# Patient Record
Sex: FEMALE | Race: WHITE | Employment: UNEMPLOYED | URBAN - METROPOLITAN AREA
[De-identification: names, ages, dates, MRNs, and addresses within clinical notes are randomized per-mention and may not be internally consistent; named-entity substitution may affect disease eponyms.]

---

## 2017-08-21 ENCOUNTER — HOSPITAL ENCOUNTER (EMERGENCY)
Age: 57
Discharge: HOME | End: 2017-08-21
Attending: EMERGENCY MEDICINE
Payer: MEDICARE

## 2017-08-21 ENCOUNTER — APPOINTMENT (OUTPATIENT)
Dept: GENERAL RADIOLOGY | Age: 57
End: 2017-08-21
Attending: PHYSICIAN ASSISTANT
Payer: MEDICARE

## 2017-08-21 VITALS
TEMPERATURE: 98.4 F | OXYGEN SATURATION: 99 % | WEIGHT: 165 LBS | DIASTOLIC BLOOD PRESSURE: 89 MMHG | RESPIRATION RATE: 16 BRPM | HEART RATE: 81 BPM | SYSTOLIC BLOOD PRESSURE: 147 MMHG | BODY MASS INDEX: 27.49 KG/M2 | HEIGHT: 65 IN

## 2017-08-21 DIAGNOSIS — M43.9 COMPRESSION DEFORMITY OF VERTEBRA: ICD-10-CM

## 2017-08-21 DIAGNOSIS — Z86.59 H/O ANXIETY DISORDER: ICD-10-CM

## 2017-08-21 DIAGNOSIS — M54.50 ACUTE BILATERAL LOW BACK PAIN WITHOUT SCIATICA: ICD-10-CM
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
## 2017-08-21 PROCEDURE — L0172 CERV COL SR FOAM 2PC PRE OTS: HCPCS

## 2017-08-21 PROCEDURE — 99284 EMERGENCY DEPT VISIT MOD MDM: CPT

## 2017-08-21 PROCEDURE — 72100 X-RAY EXAM L-S SPINE 2/3 VWS: CPT

## 2017-08-21 PROCEDURE — 72072 X-RAY EXAM THORAC SPINE 3VWS: CPT

## 2017-08-21 PROCEDURE — 74011250637 HC RX REV CODE- 250/637: Performed by: PHYSICIAN ASSISTANT

## 2017-08-21 PROCEDURE — 72050 X-RAY EXAM NECK SPINE 4/5VWS: CPT

## 2017-08-21 RX ORDER — HYDROMORPHONE HYDROCHLORIDE 2 MG/1
1 TABLET ORAL
Status: COMPLETED | OUTPATIENT
Start: 2017-08-21 | End: 2017-08-21

## 2017-08-21 RX ORDER — OXYCODONE AND ACETAMINOPHEN 5; 325 MG/1; MG/1
1 TABLET ORAL
Qty: 15 TAB | Refills: 0 | Status: SHIPPED | OUTPATIENT
Start: 2017-08-21

## 2017-08-21 RX ORDER — OXYCODONE AND ACETAMINOPHEN 5; 325 MG/1; MG/1
1 TABLET ORAL
Status: COMPLETED | OUTPATIENT
Start: 2017-08-21 | End: 2017-08-21

## 2017-08-21 RX ORDER — LIDOCAINE 50 MG/G
1 PATCH TOPICAL EVERY 24 HOURS
Status: DISCONTINUED | OUTPATIENT
Start: 2017-08-21 | End: 2017-08-21 | Stop reason: HOSPADM

## 2017-08-21 RX ADMIN — OXYCODONE AND ACETAMINOPHEN 1 TABLET: 5; 325 TABLET ORAL at 14:55

## 2017-08-21 RX ADMIN — HYDROMORPHONE HYDROCHLORIDE 1 MG: 2 TABLET ORAL at 15:55
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
## 2017-08-21 NOTE — LETTER
[address] 
[facility] EMERGENCY DEPT 
[address] [place] [address] [id number] 
[phone] Work/School Note Date: 8/21/2017 To Whom It May concern: 
 
Chandrakant Jin was seen and treated today in the emergency room by the following provider(s): 
Attending Provider: Radha Jackman DO Physician Assistant: Cinthia Price. Cyndi Nunez. Chandrakant Jin may return to work on 08/23/2017. Sincerely, 
 
 
 
 
Cinthia Price.  [place], [place]
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
## 2017-08-30 ENCOUNTER — OFFICE VISIT (OUTPATIENT)
Dept: NEUROLOGY | Age: 57
End: 2017-08-30

## 2017-08-30 VITALS
HEART RATE: 66 BPM | HEIGHT: 65 IN | RESPIRATION RATE: 18 BRPM | WEIGHT: 168 LBS | DIASTOLIC BLOOD PRESSURE: 76 MMHG | SYSTOLIC BLOOD PRESSURE: 118 MMHG | BODY MASS INDEX: 27.99 KG/M2 | OXYGEN SATURATION: 97 %

## 2017-08-30 DIAGNOSIS — M54.2 CERVICALGIA: ICD-10-CM

## 2017-08-30 DIAGNOSIS — G44.319 ACUTE POST-TRAUMATIC HEADACHE, NOT INTRACTABLE: ICD-10-CM

## 2017-08-30 DIAGNOSIS — S06.9X0A: Primary | ICD-10-CM

## 2017-08-30 RX ORDER — ASPIRIN 81 MG/1
TABLET ORAL DAILY
COMMUNITY

## 2017-08-30 RX ORDER — GINKGO BILOBA LEAF EXTRACT 60 MG
1000 CAPSULE ORAL DAILY
COMMUNITY

## 2017-08-30 RX ORDER — TRAZODONE HYDROCHLORIDE 100 MG/1
100 TABLET ORAL
COMMUNITY

## 2017-08-30 RX ORDER — EZETIMIBE 10 MG/1
TABLET ORAL
COMMUNITY

## 2017-08-30 RX ORDER — ATORVASTATIN CALCIUM 80 MG/1
TABLET, FILM COATED ORAL
COMMUNITY
Start: 2017-08-23

## 2017-08-30 RX ORDER — LORAZEPAM 1 MG/1
TABLET ORAL
COMMUNITY
Start: 2017-07-19

## 2017-08-30 NOTE — PATIENT INSTRUCTIONS
[facility]   Statement to Patients  April 1, 2014      In an effort to ensure the large volume of patient prescription refills is processed in the most efficient and expeditious manner, we are asking our patients to assist us by calling your Pharmacy for all prescription refills, this will include also your  Mail Order Pharmacy. The pharmacy will contact our office electronically to continue the refill process. Please do not wait until the last minute to call your pharmacy. We need at least 48 hours (2days) to fill prescriptions. We also encourage you to call your pharmacy before going to  your prescription to make sure it is ready. With regard to controlled substance prescription refill requests (narcotic refills) that need to be picked up at our office, we ask your cooperation by providing us with at least 72 hours (3days) notice that you will need a refill. We will not refill narcotic prescription refill requests after 4:00pm on any weekday, Monday through Thursday, or after 2:00pm on Fridays, or on the weekends. We encourage everyone to explore another way of getting your prescription refill request processed using [employer], our patient web portal through our electronic medical record system. [employer] is an efficient and effective way to communicate your medication request directly to the office and  downloadable as an bozena on your smart phone . [employer] also features a review functionality that allows you to view your medication list as well as leave messages for your physician. Are you ready to get connected? If so please review the attatched instructions or speak to any of our staff to get you set up right away! Thank you so much for your cooperation. Should you have any questions please contact our Practice Administrator.     The Physicians and Staff,  [facility]         Please bring all medication bottles, including vitamins, supplements and any over-the-counter medications, to your next office visit.
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
## 2017-08-30 NOTE — MR AVS SNAPSHOT
Visit Information Date & Time Provider Department Dept. Phone Encounter #  
 8/30/2017  8:00 AM Sally Adkins MD [facility] at [address] [id number] Follow-up Instructions Return if symptoms worsen or fail to improve. Upcoming Health Maintenance Date Due Hepatitis C Screening 1960 DTaP/Tdap/Td series (1 - Tdap) 5/20/1981 PAP AKA CERVICAL CYTOLOGY 5/20/1981 FOBT Q 1 YEAR AGE 50-75 5/20/2010 INFLUENZA AGE 9 TO ADULT 8/1/2017 BREAST CANCER SCRN MAMMOGRAM 8/15/2018 Allergies as of 8/30/2017  Review Complete On: 8/30/2017 By: Catarino Momin LPN Severity Noted Reaction Type Reactions Niaspan [Niacin] High 12/12/2015    Anaphylaxis Nucynta [Tapentadol] High 05/18/2014    Anaphylaxis Per pt, she has taken oxycodone in the past with no issues. Prednisone  05/18/2014    Other (comments)  
 hallucinations Valium [Diazepam]  05/18/2014    Other (comments) Makes patient hyper Current Immunizations  Reviewed on 12/26/2015 No immunizations on file. Not reviewed this visit Vitals BP Pulse Resp Height(growth percentile) Weight(growth percentile) SpO2  
 118/76 66 18 5' 5\" (1.651 m) 168 lb (76.2 kg) 97% BMI OB Status Smoking Status 27.96 kg/m2 Hysterectomy Never Smoker Vitals History BMI and BSA Data Body Mass Index Body Surface Area  
 27.96 kg/m 2 1.87 m 2 Preferred Pharmacy Pharmacy Name [address] [place] [phone] Your Updated Medication List  
  
   
This list is accurate as of: 8/30/17  8:38 AM.  Always use your most recent med list.  
  
  
  
  
 ALIGN 4 mg Cap Generic drug:  Bifidobacterium Infantis Take 1 Cap by mouth daily. ALLERCLEAR 10 mg tablet Generic drug:  loratadine Take 10 mg by mouth daily. AMBIEN 5 mg tablet Generic drug:  zolpidem Take 5 mg by mouth nightly. aspirin delayed-release 81 mg tablet Take  by mouth daily. atorvastatin 80 mg tablet Commonly known as:  LIPITOR * baclofen 20 mg tablet Commonly known as:  LIORESAL Take 20 mg by mouth two (2) times a day. 20mg at 6am AND 20mg at bedtime * baclofen 10 mg tablet Commonly known as:  LIORESAL Take 10 mg by mouth daily (after lunch). AT 2PM  
  
 co-enzyme Q-10 100 mg capsule Commonly known as:  CO Q-10 Take 100 mg by mouth daily (with dinner). CRESTOR 20 mg tablet Generic drug:  rosuvastatin Take 20 mg by mouth daily (with dinner). CYMBALTA 30 mg capsule Generic drug:  DULoxetine Take 60 mg by mouth nightly. dicyclomine 10 mg capsule Commonly known as:  BENTYL Take 1 Cap by mouth Before breakfast, lunch, dinner and at bedtime. estradiol 2 mg tablet Commonly known as:  ESTRACE Take 2 mg by mouth nightly. Amelia Prim-Linoleic-Gamolenic Ac 1,000 mg Cap Take 1 Cap by mouth daily (with breakfast). evening primrose oil 500 mg Cap Take 1,000 mg by mouth daily. FLONASE 50 mcg/actuation nasal spray Generic drug:  fluticasone 2 Sprays by Both Nostrils route daily. gabapentin 600 mg tablet Commonly known as:  NEURONTIN Take 300 mg by mouth daily. irbesartan 300 mg tablet Commonly known as:  AVAPRO Take 300 mg by mouth daily. * LORazepam 2 mg tablet Commonly known as:  ATIVAN Take 2 mg by mouth three (3) times daily. * LORazepam 1 mg tablet Commonly known as:  ATIVAN  
  
 multivitamin tablet Commonly known as:  ONE A DAY Take 1 Tab by mouth daily. ondansetron 8 mg disintegrating tablet Commonly known as:  ZOFRAN ODT Take 1 Tab by mouth every eight (8) hours as needed for Nausea. oxyCODONE-acetaminophen 5-325 mg per tablet Commonly known as:  PERCOCET Take 1 Tab by mouth every eight (8) hours as needed for Pain. Max Daily Amount: 3 Tabs. promethazine 25 mg tablet Commonly known as:  PHENERGAN Take 25 mg by mouth every six (6) hours as needed for Nausea. PROTONIX 40 mg tablet Generic drug:  pantoprazole Take 40 mg by mouth two (2) times a day. traZODone 100 mg tablet Commonly known as:  [place] Take 100 mg by mouth nightly. VITAMIN D3 2,000 unit Tab Generic drug:  cholecalciferol (vitamin D3) Take 2,000 Units by mouth daily. ZETIA 10 mg tablet Generic drug:  ezetimibe Take  by mouth. * Notice: This list has 4 medication(s) that are the same as other medications prescribed for you. Read the directions carefully, and ask your doctor or other care provider to review them with you. Follow-up Instructions Return if symptoms worsen or fail to improve. Patient Instructions PRESCRIPTION REFILL POLICY [facility] Statement to Patients April 1, 2014 In an effort to ensure the large volume of patient prescription refills is processed in the most efficient and expeditious manner, we are asking our patients to assist us by calling your Pharmacy for all prescription refills, this will include also your  Mail Order Pharmacy. The pharmacy will contact our office electronically to continue the refill process. Please do not wait until the last minute to call your pharmacy. We need at least 48 hours (2days) to fill prescriptions. We also encourage you to call your pharmacy before going to  your prescription to make sure it is ready. With regard to controlled substance prescription refill requests (narcotic refills) that need to be picked up at our office, we ask your cooperation by providing us with at least 72 hours (3days) notice that you will need a refill. We will not refill narcotic prescription refill requests after 4:00pm on any weekday, Monday through Thursday, or after 2:00pm on Fridays, or on the weekends. We encourage everyone to explore another way of getting your prescription refill request processed using [employer], our patient web portal through our electronic medical record system. [employer] is an efficient and effective way to communicate your medication request directly to the office and  downloadable as an bozena on your smart phone . [employer] also features a review functionality that allows you to view your medication list as well as leave messages for your physician. Are you ready to get connected? If so please review the attatched instructions or speak to any of our staff to get you set up right away! Thank you so much for your cooperation. Should you have any questions please contact our Practice Administrator. The Physicians and Staff,  [facility] Please bring all medication bottles, including vitamins, supplements and any over-the-counter medications, to your next office visit. Introducing [facility]! Kerrie Hainse introduces [employer] patient portal. Now you can access parts of your medical record, email your doctor's office, and request medication refills online. 1. In your internet browser, go to [email or web address] 2. Click on the First Time User? Click Here link in the Sign In box. You will see the New Member Sign Up page. 3. Enter your [employer] Access Code exactly as it appears below. You will not need to use this code after youve completed the sign-up process. If you do not sign up before the expiration date, you must request a new code. · [employer] Access Code: [email or web address] Expires: 11/19/2017  3:06 PM 
 
4. Enter the last four digits of your Social Security Number (xxxx) and Date of Birth (mm/dd/yyyy) as indicated and click Submit. You will be taken to the next sign-up page. 5. Create a [employer] ID. This will be your [employer] login ID and cannot be changed, so think of one that is secure and easy to remember. 6. Create a [employer] password. You can change your password at any time. 7. Enter your Password Reset Question and Answer. This can be used at a later time if you forget your password. 8. Enter your e-mail address. You will receive e-mail notification when new information is available in [address] 9. Click Sign Up. You can now view and download portions of your medical record. 10. Click the Download Summary menu link to download a portable copy of your medical information. If you have questions, please visit the Frequently Asked Questions section of the [employer] website. Remember, [employer] is NOT to be used for urgent needs. For medical emergencies, dial 911. Now available from your iPhone and Android! Please provide this summary of care documentation to your next provider. Your primary care clinician is listed as Britany Leggett. If you have any questions after today's visit, please call [phone].

## 2017-11-20 ENCOUNTER — HOSPITAL ENCOUNTER (OUTPATIENT)
Dept: MRI IMAGING | Age: 57
Discharge: HOME | End: 2017-11-20

## 2017-11-20 DIAGNOSIS — M47.817 LUMBOSACRAL SPONDYLOSIS WITHOUT MYELOPATHY: ICD-10-CM

## 2017-11-20 DIAGNOSIS — M54.50 LOW BACK PAIN: ICD-10-CM

## 2017-11-20 DIAGNOSIS — M47.812 CERVICAL SPONDYLOSIS WITHOUT MYELOPATHY: ICD-10-CM

## 2017-11-20 DIAGNOSIS — M54.2 CERVICALGIA: ICD-10-CM

## 2017-11-20 DIAGNOSIS — Z98.1 HISTORY OF LUMBAR FUSION: ICD-10-CM

## 2017-11-20 PROCEDURE — 72141 MRI NECK SPINE W/O DYE: CPT

## 2017-11-20 PROCEDURE — 72148 MRI LUMBAR SPINE W/O DYE: CPT

## 2017-12-08 ENCOUNTER — HOSPITAL ENCOUNTER (OUTPATIENT)
Dept: MRI IMAGING | Age: 57
Discharge: HOME | End: 2017-12-08
Attending: PHYSICAL MEDICINE & REHABILITATION
Payer: MEDICARE

## 2017-12-08 VITALS — BODY MASS INDEX: 28.29 KG/M2 | WEIGHT: 170 LBS

## 2017-12-08 DIAGNOSIS — G95.9 CERVICAL MYELOPATHY (HCC): ICD-10-CM

## 2017-12-08 DIAGNOSIS — M54.2 CERVICALGIA: ICD-10-CM

## 2017-12-08 LAB — CREAT BLD-MCNC: 0.7 MG/DL (ref 0.6–1.3)

## 2017-12-08 PROCEDURE — 74011250636 HC RX REV CODE- 250/636: Performed by: PHYSICAL MEDICINE & REHABILITATION

## 2017-12-08 PROCEDURE — 72142 MRI NECK SPINE W/DYE: CPT

## 2017-12-08 PROCEDURE — A9576 INJ PROHANCE MULTIPACK: HCPCS | Performed by: PHYSICAL MEDICINE & REHABILITATION

## 2017-12-08 PROCEDURE — 82565 ASSAY OF CREATININE: CPT

## 2017-12-08 RX ADMIN — GADOTERIDOL 15 ML: 279.3 INJECTION, SOLUTION INTRAVENOUS at 15:36

## 2018-12-03 ENCOUNTER — HOSPITAL ENCOUNTER (OUTPATIENT)
Age: 58
Setting detail: OUTPATIENT SURGERY
Discharge: HOME | End: 2018-12-03
Attending: SPECIALIST | Admitting: SPECIALIST
Payer: MEDICARE

## 2018-12-03 VITALS
SYSTOLIC BLOOD PRESSURE: 122 MMHG | DIASTOLIC BLOOD PRESSURE: 77 MMHG | OXYGEN SATURATION: 99 % | HEIGHT: 65 IN | RESPIRATION RATE: 18 BRPM | HEART RATE: 60 BPM | BODY MASS INDEX: 28.29 KG/M2

## 2018-12-03 PROCEDURE — 76040000007: Performed by: SPECIALIST

## 2018-12-03 PROCEDURE — 74011000250 HC RX REV CODE- 250: Performed by: SPECIALIST

## 2018-12-03 RX ORDER — LIDOCAINE HYDROCHLORIDE 20 MG/ML
JELLY TOPICAL ONCE
Status: COMPLETED | OUTPATIENT
Start: 2018-12-03 | End: 2018-12-03

## 2018-12-03 RX ADMIN — LIDOCAINE HYDROCHLORIDE 5 ML: 20 JELLY TOPICAL at 08:27

## 2018-12-03 NOTE — DISCHARGE INSTRUCTIONS
King Bryce  [id number]  1960      MANOMETRY DISCHARGE INSTRUCTION    You may resume your regular diet as tolerated. You may resume your normal daily activities. If you develop a sore throat- throat lozenges or warm salt water gargles will help. Call your Physician if you have any complications or questions. [employer] Activation    Thank you for requesting access to [employer]. Please follow the instructions below to securely access and download your online medical record. [employer] allows you to send messages to your doctor, view your test results, renew your prescriptions, schedule appointments, and more. How Do I Sign Up? 1. In your internet browser, go to [email or web address]  2. Click on the First Time User? Click Here link in the Sign In box. You will be redirect to the New Member Sign Up page. 3. Enter your [employer] Access Code exactly as it appears below. You will not need to use this code after youve completed the sign-up process. If you do not sign up before the expiration date, you must request a new code. [employer] Access Code: [email or web address]  Expires: 3/3/2019  7:58 AM (This is the date your [employer] access code will )    4. Enter the last four digits of your Social Security Number (xxxx) and Date of Birth (mm/dd/yyyy) as indicated and click Submit. You will be taken to the next sign-up page. 5. Create a [employer] ID. This will be your [employer] login ID and cannot be changed, so think of one that is secure and easy to remember. 6. Create a [employer] password. You can change your password at any time. 7. Enter your Password Reset Question and Answer. This can be used at a later time if you forget your password. 8. Enter your e-mail address. You will receive e-mail notification when new information is available in [address] 9. Click Sign Up. You can now view and download portions of your medical record.   10. Click the Download Summary menu link to download a portable copy of your medical information. Additional Information    If you have questions, please visit the Frequently Asked Questions section of the [employer] website at [email or web address] Remember, [employer] is NOT to be used for urgent needs. For medical emergencies, dial 911.

## 2018-12-12 NOTE — OP NOTES
[facility]  OPERATIVE REPORT    Sravani Guevara  MR#: [id number]  : 1960  ACCOUNT #: [de-identified]   DATE OF SERVICE: 2018    PROCEDURES PERFORMED:  1. High-resolution esophageal manometry. 2.  Impedance esophageal manometry. PREOPERATIVE DIAGNOSES:    1.  Bloating. 2.  Esophageal dyskinesia. POSTOPERATIVE DIAGNOSES:  1. Abnormal study. 2.  Relaxation of esophagogastric junction is normal.   3.  Hypercontractile esophagus with at least 1 contraction having DCI greater than 8000 in the absence of esophagogastric junction outflow obstruction. 4.  Large peristaltic breaks. 5.  Seventy percent of impedance boluses failed to clear completely. SURGEON:  Joel Purvis MD    ASSISTANT:  Delisa Aguillon RN     ANESTHESIA:  Topical .    ESTIMATED BLOOD LOSS:  none. COMPLICATIONS:  none. SPECIMENS REMOVED:  none. IMPLANTS:  none. DESCRIPTION OF PROCEDURE:  High-resolution esophageal manometry is performed by the nursing staff with subsequent interpretation by Dr. Ayla Reyna. The lower esophageal sphincter is at 41.3 cm and for a distance of 4.8 cm distally. Lower esophageal sphincter pressures are as follows:  Respiratory minimum of 28 (4.8-32), respiratory mean 51.3 (13-43), residual after swallowing normal at 12.3, normal less than 15. The upper esophageal sphincter pressure is not reported here. There is not a reliable test for measurement of or interpretation of clinical upper esophageal sphincter disorders. Wet swallows were then administered. By standard criteria, 6 are peristaltic, 2 are simultaneous and 2 are failed. The mean amplitude in the distal esophagus is normal at 132.0 mmHg. The wave duration is prolonged at 5.8 seconds. There are 22% double-peaked waves, less than 15% is normal.  There are 11% triple-peaked waves, 0% is normal.    High resolution scoring is as follows:  DCI mean elevated 6823.3 (500-5000).   DCI is some of the area under the curve of the esophageal pressure time curve. Contractile front velocity normal 2.9, intrabolus pressure at LES 2.6, which is normal.  Intrabolus pressure average maximum body of the esophagus normal at 11.3. [place] scoring is as follows:  Distal latency 13.6, percent failed 10, percent pan esophageal pressurization 0, percent premature 0, percent rapid 10, percent large breaks 60, percent small breaks 0. Impedance manometry is performed with a fluid and electrolyte solution. Seven of 10 impedance boluses failed to clear completely. There are 2 contractions with a markedly elevated DCI. These are #4 and #9. So there are 3 contractions with a markedly elevated DCI #4, #6 and #9. The large breaks refer to large transition zone defects between the proximal striated esophagus and the middle smooth muscle esophagus. The impedance manometry correlates with the manometric findings in the body of the esophagus. This is a markedly abnormal study. It is not achalasia.       Sujatha Hsu MD       [address] [place]  D: 12/11/2018 20:33     T: 12/12/2018 07:04  JOB #: [id number]  CC: Rosa Maria Chao MD  CC: Selene Romero MD

## 2019-01-07 ENCOUNTER — HOSPITAL ENCOUNTER (OUTPATIENT)
Dept: CT IMAGING | Age: 59
Discharge: HOME | End: 2019-01-07
Attending: SPECIALIST
Payer: MEDICARE

## 2019-01-07 DIAGNOSIS — R14.0 BLOATING SYMPTOM: ICD-10-CM

## 2019-01-07 DIAGNOSIS — R19.7 DIARRHEA, UNSPECIFIED: ICD-10-CM

## 2019-01-07 DIAGNOSIS — Z90.49 ACQUIRED ABSENCE OF LARGE INTESTINE: ICD-10-CM

## 2019-01-07 PROCEDURE — 74011250636 HC RX REV CODE- 250/636: Performed by: SPECIALIST

## 2019-01-07 PROCEDURE — 74177 CT ABD & PELVIS W/CONTRAST: CPT

## 2019-01-07 PROCEDURE — 74011636320 HC RX REV CODE- 636/320: Performed by: SPECIALIST

## 2019-01-07 RX ORDER — SODIUM CHLORIDE 0.9 % (FLUSH) 0.9 %
10 SYRINGE (ML) INJECTION
Status: COMPLETED | OUTPATIENT
Start: 2019-01-07 | End: 2019-01-07

## 2019-01-07 RX ORDER — SODIUM CHLORIDE 9 MG/ML
50 INJECTION, SOLUTION INTRAVENOUS
Status: COMPLETED | OUTPATIENT
Start: 2019-01-07 | End: 2019-01-07

## 2019-01-07 RX ADMIN — SODIUM CHLORIDE 50 ML/HR: 900 INJECTION, SOLUTION INTRAVENOUS at 14:28

## 2019-01-07 RX ADMIN — IOHEXOL 50 ML: 240 INJECTION, SOLUTION INTRATHECAL; INTRAVASCULAR; INTRAVENOUS; ORAL at 14:28

## 2019-01-07 RX ADMIN — Medication 10 ML: at 14:28

## 2019-01-07 RX ADMIN — IOPAMIDOL 100 ML: 755 INJECTION, SOLUTION INTRAVENOUS at 14:27

## 2022-11-05 ENCOUNTER — APPOINTMENT (OUTPATIENT)
Dept: CT IMAGING | Age: 62
End: 2022-11-05
Attending: STUDENT IN AN ORGANIZED HEALTH CARE EDUCATION/TRAINING PROGRAM
Payer: MEDICARE

## 2022-11-05 ENCOUNTER — HOSPITAL ENCOUNTER (OUTPATIENT)
Age: 62
Setting detail: OBSERVATION
Discharge: HOME | End: 2022-11-09
Attending: STUDENT IN AN ORGANIZED HEALTH CARE EDUCATION/TRAINING PROGRAM | Admitting: INTERNAL MEDICINE
Payer: MEDICARE

## 2022-11-05 DIAGNOSIS — R79.89 ELEVATED LFTS: ICD-10-CM

## 2022-11-05 DIAGNOSIS — K56.7 ILEUS (HCC): Primary | ICD-10-CM

## 2022-11-05 PROBLEM — R10.9 ABDOMINAL PAIN: Status: ACTIVE | Noted: 2022-11-05

## 2022-11-05 LAB
ALBUMIN SERPL-MCNC: 3 G/DL (ref 3.5–5)
ALBUMIN/GLOB SERPL: 0.8 {RATIO} (ref 1.1–2.2)
ALP SERPL-CCNC: 183 U/L (ref 45–117)
ALT SERPL-CCNC: 432 U/L (ref 12–78)
ANION GAP SERPL CALC-SCNC: 4 MMOL/L (ref 5–15)
APPEARANCE UR: CLEAR
AST SERPL-CCNC: 398 U/L (ref 15–37)
BACTERIA URNS QL MICRO: NEGATIVE /HPF
BASE EXCESS BLD CALC-SCNC: 4.3 MMOL/L
BASOPHILS # BLD: 0 K/UL (ref 0–0.1)
BASOPHILS NFR BLD: 0 % (ref 0–1)
BILIRUB SERPL-MCNC: 1.3 MG/DL (ref 0.2–1)
BILIRUB UR QL: NEGATIVE
BUN SERPL-MCNC: 14 MG/DL (ref 6–20)
BUN/CREAT SERPL: 18 (ref 12–20)
CA-I BLD-MCNC: 1.19 MMOL/L (ref 1.12–1.32)
CALCIUM SERPL-MCNC: 8.7 MG/DL (ref 8.5–10.1)
CHLORIDE BLD-SCNC: 105 MMOL/L (ref 100–108)
CHLORIDE SERPL-SCNC: 107 MMOL/L (ref 97–108)
CO2 BLD-SCNC: 30 MMOL/L (ref 19–24)
CO2 SERPL-SCNC: 30 MMOL/L (ref 21–32)
COLOR UR: ABNORMAL
CREAT SERPL-MCNC: 0.76 MG/DL (ref 0.55–1.02)
CREAT UR-MCNC: 0.7 MG/DL (ref 0.6–1.3)
DIFFERENTIAL METHOD BLD: ABNORMAL
EOSINOPHIL # BLD: 0.1 K/UL (ref 0–0.4)
EOSINOPHIL NFR BLD: 1 % (ref 0–7)
EPITH CASTS URNS QL MICRO: ABNORMAL /LPF
ERYTHROCYTE [DISTWIDTH] IN BLOOD BY AUTOMATED COUNT: 14.3 % (ref 11.5–14.5)
GLOBULIN SER CALC-MCNC: 3.8 G/DL (ref 2–4)
GLUCOSE BLD STRIP.AUTO-MCNC: 116 MG/DL (ref 74–106)
GLUCOSE SERPL-MCNC: 120 MG/DL (ref 65–100)
GLUCOSE UR STRIP.AUTO-MCNC: NEGATIVE MG/DL
HCO3 BLDA-SCNC: 30 MMOL/L
HCT VFR BLD AUTO: 37 % (ref 35–47)
HGB BLD-MCNC: 11.8 G/DL (ref 11.5–16)
HGB UR QL STRIP: NEGATIVE
HYALINE CASTS URNS QL MICRO: ABNORMAL /LPF (ref 0–2)
IMM GRANULOCYTES # BLD AUTO: 0.1 K/UL (ref 0–0.04)
IMM GRANULOCYTES NFR BLD AUTO: 1 % (ref 0–0.5)
KETONES UR QL STRIP.AUTO: NEGATIVE MG/DL
LACTATE BLD-SCNC: 0.59 MMOL/L (ref 0.4–2)
LEUKOCYTE ESTERASE UR QL STRIP.AUTO: ABNORMAL
LIPASE SERPL-CCNC: 86 U/L (ref 73–393)
LYMPHOCYTES # BLD: 1.1 K/UL (ref 0.8–3.5)
LYMPHOCYTES NFR BLD: 11 % (ref 12–49)
MCH RBC QN AUTO: 28.2 PG (ref 26–34)
MCHC RBC AUTO-ENTMCNC: 31.9 G/DL (ref 30–36.5)
MCV RBC AUTO: 88.5 FL (ref 80–99)
MONOCYTES # BLD: 0.5 K/UL (ref 0–1)
MONOCYTES NFR BLD: 5 % (ref 5–13)
NEUTS SEG # BLD: 8.7 K/UL (ref 1.8–8)
NEUTS SEG NFR BLD: 82 % (ref 32–75)
NITRITE UR QL STRIP.AUTO: NEGATIVE
NRBC # BLD: 0 K/UL (ref 0–0.01)
NRBC BLD-RTO: 0 PER 100 WBC
PCO2 BLDV: 50.1 MMHG (ref 41–51)
PH BLDV: 7.39 [PH] (ref 7.32–7.42)
PH UR STRIP: 6.5 [PH] (ref 5–8)
PLATELET # BLD AUTO: 278 K/UL (ref 150–400)
PMV BLD AUTO: 9.6 FL (ref 8.9–12.9)
PO2 BLDV: 18 MMHG (ref 25–40)
POTASSIUM BLD-SCNC: 3.8 MMOL/L (ref 3.5–5.5)
POTASSIUM SERPL-SCNC: 3.7 MMOL/L (ref 3.5–5.1)
PROT SERPL-MCNC: 6.8 G/DL (ref 6.4–8.2)
PROT UR STRIP-MCNC: NEGATIVE MG/DL
RBC # BLD AUTO: 4.18 M/UL (ref 3.8–5.2)
RBC #/AREA URNS HPF: ABNORMAL /HPF (ref 0–5)
SODIUM BLD-SCNC: 145 MMOL/L (ref 136–145)
SODIUM SERPL-SCNC: 141 MMOL/L (ref 136–145)
SP GR UR REFRACTOMETRY: 1.02
SPECIMEN SITE: ABNORMAL
UA: UC IF INDICATED,UAUC: ABNORMAL
UROBILINOGEN UR QL STRIP.AUTO: 0.2 EU/DL (ref 0.2–1)
WBC # BLD AUTO: 10.4 K/UL (ref 3.6–11)
WBC URNS QL MICRO: ABNORMAL /HPF (ref 0–4)

## 2022-11-05 PROCEDURE — 99285 EMERGENCY DEPT VISIT HI MDM: CPT

## 2022-11-05 PROCEDURE — 81001 URINALYSIS AUTO W/SCOPE: CPT

## 2022-11-05 PROCEDURE — 74177 CT ABD & PELVIS W/CONTRAST: CPT

## 2022-11-05 PROCEDURE — 83690 ASSAY OF LIPASE: CPT

## 2022-11-05 PROCEDURE — 80053 COMPREHEN METABOLIC PANEL: CPT

## 2022-11-05 PROCEDURE — 74011250636 HC RX REV CODE- 250/636: Performed by: STUDENT IN AN ORGANIZED HEALTH CARE EDUCATION/TRAINING PROGRAM

## 2022-11-05 PROCEDURE — 36415 COLL VENOUS BLD VENIPUNCTURE: CPT

## 2022-11-05 PROCEDURE — 82947 ASSAY GLUCOSE BLOOD QUANT: CPT

## 2022-11-05 PROCEDURE — 96375 TX/PRO/DX INJ NEW DRUG ADDON: CPT

## 2022-11-05 PROCEDURE — 96374 THER/PROPH/DIAG INJ IV PUSH: CPT

## 2022-11-05 PROCEDURE — 65270000015 HC RM PRIVATE ONCOLOGY

## 2022-11-05 PROCEDURE — 65390000012 HC CONDITION CODE 44 OBSERVATION

## 2022-11-05 PROCEDURE — 85025 COMPLETE CBC W/AUTO DIFF WBC: CPT

## 2022-11-05 PROCEDURE — 74011250636 HC RX REV CODE- 250/636: Performed by: NURSE PRACTITIONER

## 2022-11-05 PROCEDURE — 74011000636 HC RX REV CODE- 636: Performed by: STUDENT IN AN ORGANIZED HEALTH CARE EDUCATION/TRAINING PROGRAM

## 2022-11-05 RX ORDER — OMEPRAZOLE 40 MG/1
40 CAPSULE, DELAYED RELEASE ORAL DAILY
COMMUNITY

## 2022-11-05 RX ORDER — FENTANYL CITRATE 50 UG/ML
50 INJECTION, SOLUTION INTRAMUSCULAR; INTRAVENOUS ONCE
Status: COMPLETED | OUTPATIENT
Start: 2022-11-05 | End: 2022-11-05

## 2022-11-05 RX ORDER — DILTIAZEM HYDROCHLORIDE 60 MG/1
60 TABLET, FILM COATED ORAL 2 TIMES DAILY
COMMUNITY

## 2022-11-05 RX ORDER — CYCLOBENZAPRINE HCL 10 MG
10 TABLET ORAL
COMMUNITY

## 2022-11-05 RX ORDER — PROCHLORPERAZINE EDISYLATE 5 MG/ML
10 INJECTION INTRAMUSCULAR; INTRAVENOUS ONCE
Status: COMPLETED | OUTPATIENT
Start: 2022-11-05 | End: 2022-11-05

## 2022-11-05 RX ORDER — FENTANYL CITRATE 50 UG/ML
50 INJECTION, SOLUTION INTRAMUSCULAR; INTRAVENOUS
Status: ACTIVE | OUTPATIENT
Start: 2022-11-05 | End: 2022-11-06

## 2022-11-05 RX ORDER — ONDANSETRON HYDROCHLORIDE 8 MG/1
8 TABLET, FILM COATED ORAL
COMMUNITY
End: 2022-11-09

## 2022-11-05 RX ORDER — METOCLOPRAMIDE HYDROCHLORIDE 5 MG/ML
10 INJECTION INTRAMUSCULAR; INTRAVENOUS
Status: COMPLETED | OUTPATIENT
Start: 2022-11-05 | End: 2022-11-05

## 2022-11-05 RX ORDER — SODIUM CHLORIDE, SODIUM LACTATE, POTASSIUM CHLORIDE, CALCIUM CHLORIDE 600; 310; 30; 20 MG/100ML; MG/100ML; MG/100ML; MG/100ML
150 INJECTION, SOLUTION INTRAVENOUS CONTINUOUS
Status: DISCONTINUED | OUTPATIENT
Start: 2022-11-06 | End: 2022-11-08

## 2022-11-05 RX ORDER — SODIUM CHLORIDE 9 MG/ML
75 INJECTION, SOLUTION INTRAVENOUS CONTINUOUS
Status: DISCONTINUED | OUTPATIENT
Start: 2022-11-06 | End: 2022-11-06

## 2022-11-05 RX ORDER — FAMOTIDINE 40 MG/1
40 TABLET, FILM COATED ORAL DAILY
COMMUNITY

## 2022-11-05 RX ORDER — FENTANYL CITRATE 50 UG/ML
50 INJECTION, SOLUTION INTRAMUSCULAR; INTRAVENOUS
Status: DISCONTINUED | OUTPATIENT
Start: 2022-11-05 | End: 2022-11-09 | Stop reason: HOSPADM

## 2022-11-05 RX ORDER — ONDANSETRON 2 MG/ML
4 INJECTION INTRAMUSCULAR; INTRAVENOUS
Status: DISCONTINUED | OUTPATIENT
Start: 2022-11-05 | End: 2022-11-09 | Stop reason: HOSPADM

## 2022-11-05 RX ADMIN — SODIUM CHLORIDE 1000 ML: 9 INJECTION, SOLUTION INTRAVENOUS at 19:57

## 2022-11-05 RX ADMIN — PROCHLORPERAZINE EDISYLATE 10 MG: 5 INJECTION INTRAMUSCULAR; INTRAVENOUS at 19:55

## 2022-11-05 RX ADMIN — METOCLOPRAMIDE 10 MG: 5 INJECTION, SOLUTION INTRAMUSCULAR; INTRAVENOUS at 17:24

## 2022-11-05 RX ADMIN — ONDANSETRON 4 MG: 2 INJECTION INTRAMUSCULAR; INTRAVENOUS at 23:59

## 2022-11-05 RX ADMIN — IOPAMIDOL 100 ML: 755 INJECTION, SOLUTION INTRAVENOUS at 18:12

## 2022-11-05 RX ADMIN — FENTANYL CITRATE 50 MCG: 50 INJECTION INTRAMUSCULAR; INTRAVENOUS at 15:44
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
## 2022-11-06 ENCOUNTER — APPOINTMENT (OUTPATIENT)
Dept: ULTRASOUND IMAGING | Age: 62
End: 2022-11-06
Attending: STUDENT IN AN ORGANIZED HEALTH CARE EDUCATION/TRAINING PROGRAM
Payer: MEDICARE

## 2022-11-06 LAB
ALBUMIN SERPL-MCNC: 2.8 G/DL (ref 3.5–5)
ALBUMIN/GLOB SERPL: 0.8 {RATIO} (ref 1.1–2.2)
ALP SERPL-CCNC: 171 U/L (ref 45–117)
ALT SERPL-CCNC: 286 U/L (ref 12–78)
AMMONIA PLAS-SCNC: 20 UMOL/L
ANION GAP SERPL CALC-SCNC: 7 MMOL/L (ref 5–15)
APAP SERPL-MCNC: <2 UG/ML (ref 10–30)
APTT PPP: 22.4 SEC (ref 22.1–31)
AST SERPL-CCNC: 144 U/L (ref 15–37)
BILIRUB DIRECT SERPL-MCNC: 0.3 MG/DL (ref 0–0.2)
BILIRUB SERPL-MCNC: 0.8 MG/DL (ref 0.2–1)
BUN SERPL-MCNC: 11 MG/DL (ref 6–20)
BUN/CREAT SERPL: 15 (ref 12–20)
CALCIUM SERPL-MCNC: 9 MG/DL (ref 8.5–10.1)
CHLORIDE SERPL-SCNC: 107 MMOL/L (ref 97–108)
CO2 SERPL-SCNC: 28 MMOL/L (ref 21–32)
CREAT SERPL-MCNC: 0.75 MG/DL (ref 0.55–1.02)
ERYTHROCYTE [DISTWIDTH] IN BLOOD BY AUTOMATED COUNT: 14.2 % (ref 11.5–14.5)
FIBRINOGEN PPP-MCNC: 634 MG/DL (ref 200–475)
GGT SERPL-CCNC: 130 U/L (ref 5–55)
GLOBULIN SER CALC-MCNC: 3.6 G/DL (ref 2–4)
GLUCOSE SERPL-MCNC: 103 MG/DL (ref 65–100)
HAV IGM SER QL: NONREACTIVE
HBV CORE IGM SER QL: NONREACTIVE
HBV SURFACE AG SER QL: 0.24 INDEX
HBV SURFACE AG SER QL: NEGATIVE
HCT VFR BLD AUTO: 34.1 % (ref 35–47)
HCV AB SERPL QL IA: NONREACTIVE
HGB BLD-MCNC: 10.7 G/DL (ref 11.5–16)
INR PPP: 1 (ref 0.9–1.1)
LACTATE SERPL-SCNC: 0.6 MMOL/L (ref 0.4–2)
MCH RBC QN AUTO: 27.6 PG (ref 26–34)
MCHC RBC AUTO-ENTMCNC: 31.4 G/DL (ref 30–36.5)
MCV RBC AUTO: 88.1 FL (ref 80–99)
NRBC # BLD: 0 K/UL (ref 0–0.01)
NRBC BLD-RTO: 0 PER 100 WBC
PLATELET # BLD AUTO: 239 K/UL (ref 150–400)
PMV BLD AUTO: 9.6 FL (ref 8.9–12.9)
POTASSIUM SERPL-SCNC: 3.6 MMOL/L (ref 3.5–5.1)
PROT SERPL-MCNC: 6.4 G/DL (ref 6.4–8.2)
PROTHROMBIN TIME: 10.1 SEC (ref 9–11.1)
RBC # BLD AUTO: 3.87 M/UL (ref 3.8–5.2)
SODIUM SERPL-SCNC: 142 MMOL/L (ref 136–145)
SP1: NORMAL
SP2: NORMAL
SP3: NORMAL
THERAPEUTIC RANGE,PTTT: ABNORMAL SECS (ref 58–77)
WBC # BLD AUTO: 6.4 K/UL (ref 3.6–11)

## 2022-11-06 PROCEDURE — 80074 ACUTE HEPATITIS PANEL: CPT

## 2022-11-06 PROCEDURE — 74011250636 HC RX REV CODE- 250/636: Performed by: STUDENT IN AN ORGANIZED HEALTH CARE EDUCATION/TRAINING PROGRAM

## 2022-11-06 PROCEDURE — 74011000250 HC RX REV CODE- 250: Performed by: STUDENT IN AN ORGANIZED HEALTH CARE EDUCATION/TRAINING PROGRAM

## 2022-11-06 PROCEDURE — 76705 ECHO EXAM OF ABDOMEN: CPT

## 2022-11-06 PROCEDURE — 74011250637 HC RX REV CODE- 250/637: Performed by: INTERNAL MEDICINE

## 2022-11-06 PROCEDURE — 82140 ASSAY OF AMMONIA: CPT

## 2022-11-06 PROCEDURE — 85610 PROTHROMBIN TIME: CPT

## 2022-11-06 PROCEDURE — 82977 ASSAY OF GGT: CPT

## 2022-11-06 PROCEDURE — 83605 ASSAY OF LACTIC ACID: CPT

## 2022-11-06 PROCEDURE — 36415 COLL VENOUS BLD VENIPUNCTURE: CPT

## 2022-11-06 PROCEDURE — 74011250637 HC RX REV CODE- 250/637: Performed by: STUDENT IN AN ORGANIZED HEALTH CARE EDUCATION/TRAINING PROGRAM

## 2022-11-06 PROCEDURE — 82248 BILIRUBIN DIRECT: CPT

## 2022-11-06 PROCEDURE — 82525 ASSAY OF COPPER: CPT

## 2022-11-06 PROCEDURE — 96375 TX/PRO/DX INJ NEW DRUG ADDON: CPT

## 2022-11-06 PROCEDURE — 97161 PT EVAL LOW COMPLEX 20 MIN: CPT

## 2022-11-06 PROCEDURE — 65390000012 HC CONDITION CODE 44 OBSERVATION

## 2022-11-06 PROCEDURE — 97530 THERAPEUTIC ACTIVITIES: CPT

## 2022-11-06 PROCEDURE — G0378 HOSPITAL OBSERVATION PER HR: HCPCS

## 2022-11-06 PROCEDURE — 74011250636 HC RX REV CODE- 250/636: Performed by: NURSE PRACTITIONER

## 2022-11-06 PROCEDURE — 74011250637 HC RX REV CODE- 250/637: Performed by: NURSE PRACTITIONER

## 2022-11-06 PROCEDURE — 86038 ANTINUCLEAR ANTIBODIES: CPT

## 2022-11-06 PROCEDURE — 96376 TX/PRO/DX INJ SAME DRUG ADON: CPT

## 2022-11-06 PROCEDURE — 80143 DRUG ASSAY ACETAMINOPHEN: CPT

## 2022-11-06 PROCEDURE — 85027 COMPLETE CBC AUTOMATED: CPT

## 2022-11-06 PROCEDURE — 80053 COMPREHEN METABOLIC PANEL: CPT

## 2022-11-06 RX ORDER — ROSUVASTATIN CALCIUM 20 MG/1
20 TABLET, COATED ORAL
Status: DISCONTINUED | OUTPATIENT
Start: 2022-11-06 | End: 2022-11-06 | Stop reason: SDUPTHER

## 2022-11-06 RX ORDER — TRAZODONE HYDROCHLORIDE 100 MG/1
100 TABLET ORAL
Status: DISCONTINUED | OUTPATIENT
Start: 2022-11-06 | End: 2022-11-09 | Stop reason: HOSPADM

## 2022-11-06 RX ORDER — LOSARTAN POTASSIUM 100 MG/1
100 TABLET ORAL DAILY
Status: DISCONTINUED | OUTPATIENT
Start: 2022-11-06 | End: 2022-11-09 | Stop reason: HOSPADM

## 2022-11-06 RX ORDER — BACLOFEN 10 MG/1
10 TABLET ORAL 3 TIMES DAILY
Status: DISCONTINUED | OUTPATIENT
Start: 2022-11-06 | End: 2022-11-09 | Stop reason: HOSPADM

## 2022-11-06 RX ORDER — GABAPENTIN 300 MG/1
600 CAPSULE ORAL
Status: DISCONTINUED | OUTPATIENT
Start: 2022-11-06 | End: 2022-11-09 | Stop reason: HOSPADM

## 2022-11-06 RX ORDER — DULOXETIN HYDROCHLORIDE 30 MG/1
60 CAPSULE, DELAYED RELEASE ORAL
Status: DISCONTINUED | OUTPATIENT
Start: 2022-11-06 | End: 2022-11-06

## 2022-11-06 RX ORDER — EZETIMIBE 10 MG/1
10 TABLET ORAL DAILY
Status: DISCONTINUED | OUTPATIENT
Start: 2022-11-06 | End: 2022-11-06

## 2022-11-06 RX ORDER — EZETIMIBE 10 MG/1
5 TABLET ORAL DAILY
Status: DISCONTINUED | OUTPATIENT
Start: 2022-11-06 | End: 2022-11-09 | Stop reason: HOSPADM

## 2022-11-06 RX ORDER — ATORVASTATIN CALCIUM 40 MG/1
80 TABLET, FILM COATED ORAL DAILY
Status: DISCONTINUED | OUTPATIENT
Start: 2022-11-06 | End: 2022-11-06

## 2022-11-06 RX ORDER — LORAZEPAM 1 MG/1
1 TABLET ORAL 3 TIMES DAILY
COMMUNITY

## 2022-11-06 RX ORDER — CYCLOBENZAPRINE HCL 10 MG
10 TABLET ORAL
Status: DISCONTINUED | OUTPATIENT
Start: 2022-11-06 | End: 2022-11-09 | Stop reason: HOSPADM

## 2022-11-06 RX ORDER — FLUTICASONE PROPIONATE 50 MCG
2 SPRAY, SUSPENSION (ML) NASAL DAILY
Status: DISCONTINUED | OUTPATIENT
Start: 2022-11-06 | End: 2022-11-09 | Stop reason: HOSPADM

## 2022-11-06 RX ORDER — KETOROLAC TROMETHAMINE 30 MG/ML
15 INJECTION, SOLUTION INTRAMUSCULAR; INTRAVENOUS
Status: DISCONTINUED | OUTPATIENT
Start: 2022-11-06 | End: 2022-11-09 | Stop reason: HOSPADM

## 2022-11-06 RX ORDER — DICYCLOMINE HYDROCHLORIDE 10 MG/1
10 CAPSULE ORAL
Status: DISCONTINUED | OUTPATIENT
Start: 2022-11-06 | End: 2022-11-06

## 2022-11-06 RX ORDER — DILTIAZEM HYDROCHLORIDE 60 MG/1
60 TABLET, FILM COATED ORAL 2 TIMES DAILY
Status: DISCONTINUED | OUTPATIENT
Start: 2022-11-06 | End: 2022-11-09 | Stop reason: HOSPADM

## 2022-11-06 RX ORDER — ASPIRIN 325 MG
325 TABLET, DELAYED RELEASE (ENTERIC COATED) ORAL 2 TIMES DAILY
Status: DISCONTINUED | OUTPATIENT
Start: 2022-11-06 | End: 2022-11-09 | Stop reason: HOSPADM

## 2022-11-06 RX ORDER — GABAPENTIN 300 MG/1
600 CAPSULE ORAL DAILY
Status: DISCONTINUED | OUTPATIENT
Start: 2022-11-06 | End: 2022-11-06

## 2022-11-06 RX ORDER — LORAZEPAM 0.5 MG/1
0.5 TABLET ORAL 2 TIMES DAILY
Status: DISCONTINUED | OUTPATIENT
Start: 2022-11-06 | End: 2022-11-06

## 2022-11-06 RX ORDER — BACLOFEN 10 MG/1
20 TABLET ORAL 2 TIMES DAILY
Status: DISCONTINUED | OUTPATIENT
Start: 2022-11-06 | End: 2022-11-06

## 2022-11-06 RX ORDER — GABAPENTIN 600 MG/1
600 TABLET ORAL
COMMUNITY

## 2022-11-06 RX ORDER — ESTRADIOL 0.1 MG/G
2 CREAM VAGINAL
COMMUNITY

## 2022-11-06 RX ORDER — DICYCLOMINE HYDROCHLORIDE 10 MG/1
10 CAPSULE ORAL 3 TIMES DAILY
Status: DISCONTINUED | OUTPATIENT
Start: 2022-11-06 | End: 2022-11-09 | Stop reason: HOSPADM

## 2022-11-06 RX ORDER — ESTRADIOL 1 MG/1
2 TABLET ORAL
Status: DISCONTINUED | OUTPATIENT
Start: 2022-11-06 | End: 2022-11-06

## 2022-11-06 RX ORDER — ZOLPIDEM TARTRATE 5 MG/1
5 TABLET ORAL
Status: DISCONTINUED | OUTPATIENT
Start: 2022-11-06 | End: 2022-11-06

## 2022-11-06 RX ORDER — OXYCODONE AND ACETAMINOPHEN 5; 325 MG/1; MG/1
1 TABLET ORAL
Status: DISCONTINUED | OUTPATIENT
Start: 2022-11-06 | End: 2022-11-09 | Stop reason: HOSPADM

## 2022-11-06 RX ORDER — LORAZEPAM 1 MG/1
1 TABLET ORAL 3 TIMES DAILY
Status: DISCONTINUED | OUTPATIENT
Start: 2022-11-06 | End: 2022-11-09 | Stop reason: HOSPADM

## 2022-11-06 RX ORDER — BACLOFEN 10 MG/1
10 TABLET ORAL
Status: DISCONTINUED | OUTPATIENT
Start: 2022-11-06 | End: 2022-11-06

## 2022-11-06 RX ORDER — EZETIMIBE 10 MG/1
5 TABLET ORAL DAILY
COMMUNITY

## 2022-11-06 RX ORDER — ASPIRIN 81 MG/1
81 TABLET ORAL DAILY
Status: DISCONTINUED | OUTPATIENT
Start: 2022-11-06 | End: 2022-11-06

## 2022-11-06 RX ORDER — CETIRIZINE HYDROCHLORIDE 10 MG/1
10 TABLET ORAL DAILY
Status: DISCONTINUED | OUTPATIENT
Start: 2022-11-06 | End: 2022-11-09 | Stop reason: HOSPADM

## 2022-11-06 RX ORDER — DULOXETIN HYDROCHLORIDE 30 MG/1
60 CAPSULE, DELAYED RELEASE ORAL 2 TIMES DAILY
Status: DISCONTINUED | OUTPATIENT
Start: 2022-11-06 | End: 2022-11-09 | Stop reason: HOSPADM

## 2022-11-06 RX ORDER — ATORVASTATIN CALCIUM 40 MG/1
80 TABLET, FILM COATED ORAL EVERY EVENING
Status: DISCONTINUED | OUTPATIENT
Start: 2022-11-07 | End: 2022-11-09 | Stop reason: HOSPADM

## 2022-11-06 RX ORDER — ZOLPIDEM TARTRATE 5 MG/1
5 TABLET ORAL
Status: DISCONTINUED | OUTPATIENT
Start: 2022-11-06 | End: 2022-11-09 | Stop reason: HOSPADM

## 2022-11-06 RX ADMIN — LOSARTAN POTASSIUM 100 MG: 100 TABLET, FILM COATED ORAL at 09:19

## 2022-11-06 RX ADMIN — KETOROLAC TROMETHAMINE 15 MG: 30 INJECTION, SOLUTION INTRAMUSCULAR at 18:24

## 2022-11-06 RX ADMIN — ONDANSETRON 4 MG: 2 INJECTION INTRAMUSCULAR; INTRAVENOUS at 09:21

## 2022-11-06 RX ADMIN — TRAZODONE HYDROCHLORIDE 100 MG: 100 TABLET ORAL at 21:58

## 2022-11-06 RX ADMIN — DULOXETINE HYDROCHLORIDE 60 MG: 30 CAPSULE, DELAYED RELEASE ORAL at 21:57

## 2022-11-06 RX ADMIN — FAMOTIDINE 20 MG: 10 INJECTION, SOLUTION INTRAVENOUS at 12:48

## 2022-11-06 RX ADMIN — DILTIAZEM HYDROCHLORIDE 60 MG: 60 TABLET, FILM COATED ORAL at 08:23

## 2022-11-06 RX ADMIN — Medication 1 CAPSULE: at 08:19

## 2022-11-06 RX ADMIN — ATORVASTATIN CALCIUM 80 MG: 40 TABLET, FILM COATED ORAL at 08:17

## 2022-11-06 RX ADMIN — TRAZODONE HYDROCHLORIDE 100 MG: 100 TABLET ORAL at 01:30

## 2022-11-06 RX ADMIN — CETIRIZINE HYDROCHLORIDE 10 MG: 10 TABLET, FILM COATED ORAL at 08:24

## 2022-11-06 RX ADMIN — GABAPENTIN 600 MG: 300 CAPSULE ORAL at 21:57

## 2022-11-06 RX ADMIN — DICYCLOMINE HYDROCHLORIDE 10 MG: 10 CAPSULE ORAL at 18:28

## 2022-11-06 RX ADMIN — KETOROLAC TROMETHAMINE 15 MG: 30 INJECTION, SOLUTION INTRAMUSCULAR at 09:20

## 2022-11-06 RX ADMIN — SODIUM CHLORIDE, POTASSIUM CHLORIDE, SODIUM LACTATE AND CALCIUM CHLORIDE 150 ML/HR: 600; 310; 30; 20 INJECTION, SOLUTION INTRAVENOUS at 20:20

## 2022-11-06 RX ADMIN — FLUTICASONE PROPIONATE 2 SPRAY: 50 SPRAY, METERED NASAL at 09:18

## 2022-11-06 RX ADMIN — LORAZEPAM 1 MG: 1 TABLET ORAL at 18:28

## 2022-11-06 RX ADMIN — SODIUM CHLORIDE, POTASSIUM CHLORIDE, SODIUM LACTATE AND CALCIUM CHLORIDE 150 ML/HR: 600; 310; 30; 20 INJECTION, SOLUTION INTRAVENOUS at 12:49

## 2022-11-06 RX ADMIN — ONDANSETRON 4 MG: 2 INJECTION INTRAMUSCULAR; INTRAVENOUS at 18:28

## 2022-11-06 RX ADMIN — DICYCLOMINE HYDROCHLORIDE 10 MG: 10 CAPSULE ORAL at 21:59

## 2022-11-06 RX ADMIN — ONDANSETRON 4 MG: 2 INJECTION INTRAMUSCULAR; INTRAVENOUS at 13:20

## 2022-11-06 RX ADMIN — EZETIMIBE 5 MG: 10 TABLET ORAL at 13:21

## 2022-11-06 RX ADMIN — FAMOTIDINE 20 MG: 10 INJECTION, SOLUTION INTRAVENOUS at 01:27

## 2022-11-06 RX ADMIN — ASPIRIN 325 MG: 325 TABLET, COATED ORAL at 18:26

## 2022-11-06 RX ADMIN — BACLOFEN 10 MG: 10 TABLET ORAL at 05:37

## 2022-11-06 RX ADMIN — DULOXETINE HYDROCHLORIDE 60 MG: 30 CAPSULE, DELAYED RELEASE ORAL at 01:30

## 2022-11-06 RX ADMIN — LORAZEPAM 1 MG: 1 TABLET ORAL at 08:24

## 2022-11-06 RX ADMIN — BACLOFEN 10 MG: 10 TABLET ORAL at 18:27

## 2022-11-06 RX ADMIN — SODIUM CHLORIDE, POTASSIUM CHLORIDE, SODIUM LACTATE AND CALCIUM CHLORIDE 150 ML/HR: 600; 310; 30; 20 INJECTION, SOLUTION INTRAVENOUS at 01:24

## 2022-11-06 RX ADMIN — FAMOTIDINE 20 MG: 10 INJECTION, SOLUTION INTRAVENOUS at 21:56

## 2022-11-06 RX ADMIN — DILTIAZEM HYDROCHLORIDE 60 MG: 60 TABLET, FILM COATED ORAL at 18:26

## 2022-11-06 RX ADMIN — BACLOFEN 10 MG: 10 TABLET ORAL at 21:56

## 2022-11-06 RX ADMIN — DICYCLOMINE HYDROCHLORIDE 10 MG: 10 CAPSULE ORAL at 08:24

## 2022-11-06 RX ADMIN — ONDANSETRON 4 MG: 2 INJECTION INTRAMUSCULAR; INTRAVENOUS at 22:04

## 2022-11-06 RX ADMIN — LORAZEPAM 1 MG: 1 TABLET ORAL at 21:58

## 2022-11-06 RX ADMIN — SODIUM CHLORIDE 75 ML/HR: 9 INJECTION, SOLUTION INTRAVENOUS at 00:02

## 2022-11-06 NOTE — ED NOTES
TRANSITION OF CARE - SBAR OUT    Patient is being transferred to [facility] 1 Medical Oncology, Room# 1123. Report GIVEN TO Halley Bryan RN on Isabel Cruz for routine progression of care. Report is consisted of the following information SBAR, ED Summary, and MAR. Patient transferred to receiving unit by: Transportation (RN or [facility] Name)     Called outstanding consults: Yes Yes    Collected routine labs: Yes . \    All current orders reviewed with accepting nurse: Yes    The following personal items will be sent with the patient during transfer to the floor: All valuables:                          CARDIAC MONITORING ORDERED: No  .      The following CURRENT information were reported to the receiving RN:    CODE STATUS: Full Code    NIH SCORE:    TYE SCREENING:      NEURO ASSESSMENT:        RESTRAINTS IN USE: No      IS DOCUMENTATION COMPLETE: Yes      Vital Signs  Level of Consciousness: Alert (0) (11/05/22 1455)  Temp: 99.4 °F (37.4 °C) (11/05/22 1455)  Temp Source: Oral (11/05/22 1455)  Pulse (Heart Rate): 99 (11/05/22 1455)  Heart Rate Source: Monitor (11/05/22 1455)  Cardiac Rhythm: Sinus Tachy, Sinus Rhythm (11/05/22 1504)  Resp Rate: 18 (11/05/22 1455)  BP: 123/76 (11/05/22 2134)  MAP (Monitor): 90 (11/05/22 2134)  MAP (Calculated): 92 (11/05/22 2134)  BP 1 Location: Right arm (11/05/22 1455)  BP Patient Position: At rest (11/05/22 1455)  MEWS Score: 1 (11/05/22 1455)  Pain 1  Pain Scale 1: Numeric (0 - 10) (11/05/22 1455)  Pain Intensity 1: 8 (11/05/22 1455)      OXYGEN: Oxygen Therapy  O2 Device: None (Room air) (11/05/22 1455)    KINDER FALL ASSESSMENT:  Presents to emergency department  because of falls (Syncope, seizure, or loss of consciousness): No, Age > 79:  No, Altered Mental Status, Intoxication with alcohol or substance confusion (Disorientation, impaired judgment, poor safety awaremess, or inability to follow instructions): No, Impaired Mobility: Ambulates or transfers with assistive devices or assistance; Unable to ambulate or transer.: No, Nursing Judgement : No    WOUNDS: Yes . URINARY CATHETER: voiding    LINE ACCESS:   Peripheral IV 11/05/22 Left;Posterior Hand (Active)       Peripheral IV 26/73/48 Right Basilic (Active)       Peripheral IV 11/05/22 Anterior;Distal;Left Forearm (Active)        Opportunity for questions and clarification were provided.   Linnette Rizo RN

## 2022-11-06 NOTE — PROGRESS NOTES
Received notification from bedside RN about patient with regards to: non opioid pain management  VS: /73, , RR 18, O2 sat 100% on RA    Intervention given: Toradol IV PRN ordered

## 2022-11-06 NOTE — PROGRESS NOTES
Hospitalist Progress Note    Subjective:   Daily Progress Note: 11/6/2022 11:27 AM    Hospital Course: Patient was admitted with increasing abdominal pain, nausea and vomiting. Recent right hip surgery on 11/2/22 by [address] [place] [address] Dr. Pollo Delaney at [facility]: Patient observed resting in bed with eyes opened. States that nausea and vomiting have improved. Denies complaints of chest pain, shortness of breath, palpitations, dizziness or distress.     Current Facility-Administered Medications   Medication Dose Route Frequency    LORazepam (ATIVAN) tablet 1 mg  1 mg Oral TID    fluticasone propionate (FLONASE) 50 mcg/actuation nasal spray 2 Spray  2 Spray Both Nostrils DAILY    dicyclomine (BENTYL) capsule 10 mg  10 mg Oral AC&HS    oxyCODONE-acetaminophen (PERCOCET) 5-325 mg per tablet 1 Tablet  1 Tablet Oral Q8H PRN    ezetimibe (ZETIA) tablet 10 mg  10 mg Oral DAILY    traZODone (DESYREL) tablet 100 mg  100 mg Oral QHS    dilTIAZem IR (CARDIZEM) tablet 60 mg  60 mg Oral BID    cyclobenzaprine (FLEXERIL) tablet 10 mg  10 mg Oral QHS    losartan (COZAAR) tablet 100 mg  100 mg Oral DAILY    L.acidophilus-paracasei-S.thermophil-bifidobacter (RISAQUAD) 8 billion cell capsule  1 Capsule Oral DAILY    cetirizine (ZYRTEC) tablet 10 mg  10 mg Oral DAILY    zolpidem (AMBIEN) tablet 5 mg  5 mg Oral QHS PRN    ketorolac (TORADOL) injection 15 mg  15 mg IntraVENous Q6H PRN    baclofen (LIORESAL) tablet 10 mg  10 mg Oral TID    DULoxetine (CYMBALTA) capsule 60 mg  60 mg Oral BID    gabapentin (NEURONTIN) capsule 600 mg  600 mg Oral QHS    aspirin delayed-release tablet 325 mg  325 mg Oral BID    [START ON 11/7/2022] atorvastatin (LIPITOR) tablet 80 mg  80 mg Oral QPM    fentaNYL citrate (PF) injection 50 mcg  50 mcg IntraVENous ONCE PRN    ondansetron (ZOFRAN) injection 4 mg  4 mg IntraVENous Q4H PRN    famotidine (PF) (PEPCID) 20 mg in 0.9% sodium chloride 10 mL injection  20 mg IntraVENous Q12H lactated Ringers infusion  150 mL/hr IntraVENous CONTINUOUS    fentaNYL citrate (PF) injection 50 mcg  50 mcg IntraVENous Q4H PRN    Please enter ht and wt in chart  1 Each Other NOW        Review of Systems:    Review of Systems   Constitutional: Negative. HENT: Negative. Eyes: Negative. Respiratory: Negative. Cardiovascular: Negative. Gastrointestinal:         Nausea and vomiting have improved. Genitourinary: Negative. Musculoskeletal: Negative. Skin: Negative. Neurological: Negative. Endo/Heme/Allergies: Negative. Psychiatric/Behavioral: Negative. Objective:     Visit Vitals  BP (!) 141/78 (BP 1 Location: Left upper arm, BP Patient Position: At rest)   Pulse 94   Temp 98.5 °F (36.9 °C)   Resp 17   Ht 5' 5\" (1.651 m)   Wt 82.1 kg (181 lb)   SpO2 99%   BMI 30.12 kg/m²      O2 Device: None (Room air)    Temp (24hrs), Av.8 °F (37.1 °C), Min:98.5 °F (36.9 °C), Max:99.4 °F (37.4 °C)      No intake/output data recorded. No intake/output data recorded. PHYSICAL EXAM:    Physical Exam  Vitals reviewed. Constitutional:       Appearance: Normal appearance. HENT:      Head: Normocephalic and atraumatic. Right Ear: External ear normal.      Left Ear: External ear normal.      Nose: Nose normal.      Mouth/Throat:      Mouth: Mucous membranes are moist.   Eyes:      Pupils: Pupils are equal, round, and reactive to light. Cardiovascular:      Rate and Rhythm: Normal rate and regular rhythm. Pulses: Normal pulses. Heart sounds: Normal heart sounds. Pulmonary:      Effort: Pulmonary effort is normal.      Breath sounds: Normal breath sounds. Abdominal:      General: Bowel sounds are normal.      Comments: Last bowel movement 22. Musculoskeletal:      Cervical back: Normal range of motion and neck supple. Comments: Limited ROM to right hip. Skin:     General: Skin is warm and dry. Capillary Refill: Capillary refill takes 2 to 3 seconds. Comments: Bruising, non-pitting edema, slight erythema and warmth noted to right hip. Dressing is clean, dry, and intact. Neurological:      Mental Status: She is alert and oriented to person, place, and time.    Psychiatric:         Mood and Affect: Mood normal.          Data Review    Recent Results (from the past 24 hour(s))   URINALYSIS W/ REFLEX CULTURE    Collection Time: 11/05/22  4:48 PM    Specimen: Urine   Result Value Ref Range    Color YELLOW/STRAW      Appearance CLEAR CLEAR      Specific gravity 1.016      pH (UA) 6.5 5.0 - 8.0      Protein Negative NEG mg/dL    Glucose Negative NEG mg/dL    Ketone Negative NEG mg/dL    Bilirubin Negative NEG      Blood Negative NEG      Urobilinogen 0.2 0.2 - 1.0 EU/dL    Nitrites Negative NEG      Leukocyte Esterase TRACE (A) NEG      UA:UC IF INDICATED CULTURE NOT INDICATED BY UA RESULT      WBC 0-4 0 - 4 /hpf    RBC 0-5 0 - 5 /hpf    Epithelial cells MANY (A) FEW /lpf    Bacteria Negative NEG /hpf    Hyaline cast 0-2 0 - 2 /lpf   BLOOD GAS,CHEM8,LACTIC ACID POC    Collection Time: 11/05/22  5:20 PM   Result Value Ref Range    Calcium, ionized (POC) 1.19 1.12 - 1.32 mmol/L    BICARBONATE 30 mmol/L    Base excess (POC) 4.3 mmol/L    Sample source VENOUS BLOOD      CO2, POC 30 (H) 19 - 24 MMOL/L    Sodium,  136 - 145 MMOL/L    Potassium, POC 3.8 3.5 - 5.5 MMOL/L    Chloride,  100 - 108 MMOL/L    Glucose,  (H) 74 - 106 MG/DL    Creatinine, POC 0.7 0.6 - 1.3 MG/DL    Lactic Acid (POC) 0.59 0.40 - 2.00 mmol/L    pH, venous (POC) 7.39 7.32 - 7.42      pCO2, venous (POC) 50.1 41 - 51 MMHG    pO2, venous (POC) 18 (L) 25 - 40 mmHg   CBC WITH AUTOMATED DIFF    Collection Time: 11/05/22  5:23 PM   Result Value Ref Range    WBC 10.4 3.6 - 11.0 K/uL    RBC 4.18 3.80 - 5.20 M/uL    HGB 11.8 11.5 - 16.0 g/dL    HCT 37.0 35.0 - 47.0 %    MCV 88.5 80.0 - 99.0 FL    MCH 28.2 26.0 - 34.0 PG    MCHC 31.9 30.0 - 36.5 g/dL    RDW 14.3 11.5 - 14.5 %    PLATELET 969 150 - 400 K/uL    MPV 9.6 8.9 - 12.9 FL    NRBC 0.0 0  WBC    ABSOLUTE NRBC 0.00 0.00 - 0.01 K/uL    NEUTROPHILS 82 (H) 32 - 75 %    LYMPHOCYTES 11 (L) 12 - 49 %    MONOCYTES 5 5 - 13 %    EOSINOPHILS 1 0 - 7 %    BASOPHILS 0 0 - 1 %    IMMATURE GRANULOCYTES 1 (H) 0.0 - 0.5 %    ABS. NEUTROPHILS 8.7 (H) 1.8 - 8.0 K/UL    ABS. LYMPHOCYTES 1.1 0.8 - 3.5 K/UL    ABS. MONOCYTES 0.5 0.0 - 1.0 K/UL    ABS. EOSINOPHILS 0.1 0.0 - 0.4 K/UL    ABS. BASOPHILS 0.0 0.0 - 0.1 K/UL    ABS. IMM. GRANS. 0.1 (H) 0.00 - 0.04 K/UL    DF AUTOMATED     METABOLIC PANEL, COMPREHENSIVE    Collection Time: 11/05/22  5:23 PM   Result Value Ref Range    Sodium 141 136 - 145 mmol/L    Potassium 3.7 3.5 - 5.1 mmol/L    Chloride 107 97 - 108 mmol/L    CO2 30 21 - 32 mmol/L    Anion gap 4 (L) 5 - 15 mmol/L    Glucose 120 (H) 65 - 100 mg/dL    BUN 14 6 - 20 MG/DL    Creatinine 0.76 0.55 - 1.02 MG/DL    BUN/Creatinine ratio 18 12 - 20      eGFR >60 >60 ml/min/1.73m2    Calcium 8.7 8.5 - 10.1 MG/DL    Bilirubin, total 1.3 (H) 0.2 - 1.0 MG/DL    ALT (SGPT) 432 (H) 12 - 78 U/L    AST (SGOT) 398 (H) 15 - 37 U/L    Alk.  phosphatase 183 (H) 45 - 117 U/L    Protein, total 6.8 6.4 - 8.2 g/dL    Albumin 3.0 (L) 3.5 - 5.0 g/dL    Globulin 3.8 2.0 - 4.0 g/dL    A-G Ratio 0.8 (L) 1.1 - 2.2     LIPASE    Collection Time: 11/05/22  5:23 PM   Result Value Ref Range    Lipase 86 73 - 393 U/L   CBC W/O DIFF    Collection Time: 11/06/22  6:18 AM   Result Value Ref Range    WBC 6.4 3.6 - 11.0 K/uL    RBC 3.87 3.80 - 5.20 M/uL    HGB 10.7 (L) 11.5 - 16.0 g/dL    HCT 34.1 (L) 35.0 - 47.0 %    MCV 88.1 80.0 - 99.0 FL    MCH 27.6 26.0 - 34.0 PG    MCHC 31.4 30.0 - 36.5 g/dL    RDW 14.2 11.5 - 14.5 %    PLATELET 615 743 - 675 K/uL    MPV 9.6 8.9 - 12.9 FL    NRBC 0.0 0  WBC    ABSOLUTE NRBC 0.00 0.00 - 9.17 K/uL   METABOLIC PANEL, COMPREHENSIVE    Collection Time: 11/06/22  6:18 AM   Result Value Ref Range    Sodium 142 136 - 145 mmol/L    Potassium 3.6 3.5 - 5.1 mmol/L    Chloride 107 97 - 108 mmol/L    CO2 28 21 - 32 mmol/L    Anion gap 7 5 - 15 mmol/L    Glucose 103 (H) 65 - 100 mg/dL    BUN 11 6 - 20 MG/DL    Creatinine 0.75 0.55 - 1.02 MG/DL    BUN/Creatinine ratio 15 12 - 20      eGFR >60 >60 ml/min/1.73m2    Calcium 9.0 8.5 - 10.1 MG/DL    Bilirubin, total 0.8 0.2 - 1.0 MG/DL    ALT (SGPT) 286 (H) 12 - 78 U/L    AST (SGOT) 144 (H) 15 - 37 U/L    Alk. phosphatase 171 (H) 45 - 117 U/L    Protein, total 6.4 6.4 - 8.2 g/dL    Albumin 2.8 (L) 3.5 - 5.0 g/dL    Globulin 3.6 2.0 - 4.0 g/dL    A-G Ratio 0.8 (L) 1.1 - 2.2     AMMONIA    Collection Time: 11/06/22  6:18 AM   Result Value Ref Range    Ammonia, plasma 20 <32 UMOL/L   ACETAMINOPHEN    Collection Time: 11/06/22  6:18 AM   Result Value Ref Range    Acetaminophen level <2 (L) 10 - 30 ug/mL   COAGULATION SCREEN    Collection Time: 11/06/22  6:18 AM   Result Value Ref Range    Fibrinogen 634 (H) 200 - 475 mg/dL    INR 1.0 0.9 - 1.1      Prothrombin time 10.1 9.0 - 11.1 sec    aPTT 22.4 22.1 - 31.0 sec    aPTT, therapeutic range     58.0 - 77.0 SECS   LACTIC ACID    Collection Time: 11/06/22  6:18 AM   Result Value Ref Range    Lactic acid 0.6 0.4 - 2.0 MMOL/L   BILIRUBIN, DIRECT    Collection Time: 11/06/22  6:18 AM   Result Value Ref Range    Bilirubin, direct 0.3 (H) 0.0 - 0.2 MG/DL       CT ABD PELV W CONT   Final Result   Ileus pattern of the bowels. Trace ascites. Right lateral hip region   presumed postsurgical findings. US ABD LTD    (Results Pending)       Active Problems:    Ileus ([address] (11/5/2022)      Abdominal pain (11/5/2022)        Assessment/Plan:     Transaminitis/Ascites    - CT shows ileus patterns of the bowels. Trace ascites. - AST and ALT trending downward     - avoid hepatoxic medications     - hold home diclofenac     - continue IVF     - MONIQUE Direct and Hepatitis Panel pending     - Abdominal ultrasound pending     - will monitor labwork     - GI consulted    2.  CAD    - continue aspirin, statin, arb    3. Anxiety     - continue trazodone, ativan     4. Muscle spasms    - continue baclofen/flexeril    5. Chronic pain    - continue current medications    6. Recent right hip surgery 11/2/22     - hip precautions     - will consult ortho for further management    Discharge disposition: pending GI and Ortho work up.     DVT Prophylaxis: Aspirin 325mg po bid  Code Status:  Full Code  POA:  Jesús Santamaria, spouse [phone]    Care Plan discussed with: patient, spouse, nursing.  _______________________________________________________________    Manuel Ng NP

## 2022-11-06 NOTE — PROGRESS NOTES
Anad of Shift Note    Bedside shift change report given to Ned Quijano (oncoming nurse) by Adelita Yan RN (offgoing nurse). Report included the following information SBAR, Kardex, Intake/Output, and MAR    Shift worked:  7P-7A     Shift summary and any significant changes:     Patient arrived to the floor from ED. Hygiene care was done and VS are taken. Patient arrived to the floor with 2 IV lines. One became infiltrated at [facility], the second at 5 45. Tree attempts to establish new IV line by two nurses were done unsuccessfully. ED department was contacted regarding new IV line. Labs are not drawn because patient is a hard stick. This information is passed to the day shift nurse. Patient refused to take Flexeril, she states she takes it on PRN basis. Family member is present at the bedside. Concerns for physician to address:       Zone phone for oncoming shift:          Activity:  Activity Level: Bed Rest  Number times ambulated in hallways past shift: 0  Number of times OOB to chair past shift: 0    Cardiac:   Cardiac Monitoring: No      Cardiac Rhythm: Sinus Tachy, Sinus Rhythm    Access:  Current line(s): PIV     Genitourinary:   Urinary status: voiding    Respiratory:   O2 Device: None (Room air)  Chronic home O2 use?: NO  Incentive spirometer at bedside: NO       GI:     Current diet:  DIET NPO  Passing flatus: YES  Tolerating current diet: YES       Pain Management:   Patient states pain is manageable on current regimen: YES    Skin:  Blair Score: 20  Interventions: float heels and PT/OT consult    Patient Safety:  Fall Score:  Total Score: 3  Interventions: assistive device (walker, cane, etc), gripper socks, and pt to call before getting OOB  High Fall Risk: Yes    Length of Stay:  Expected LOS: - - -  Actual LOS: 1      Adelita Yan, RN
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
## 2022-11-07 ENCOUNTER — APPOINTMENT (OUTPATIENT)
Dept: GENERAL RADIOLOGY | Age: 62
End: 2022-11-07
Attending: PHYSICIAN ASSISTANT
Payer: MEDICARE

## 2022-11-07 LAB
ALBUMIN SERPL-MCNC: 2.7 G/DL (ref 3.5–5)
ALBUMIN/GLOB SERPL: 0.7 {RATIO} (ref 1.1–2.2)
ALP SERPL-CCNC: 69 U/L (ref 45–117)
ALT SERPL-CCNC: 30 U/L (ref 12–78)
ANION GAP SERPL CALC-SCNC: 5 MMOL/L (ref 5–15)
AST SERPL-CCNC: 34 U/L (ref 15–37)
BASOPHILS # BLD: 0 K/UL (ref 0–0.1)
BASOPHILS NFR BLD: 1 % (ref 0–1)
BILIRUB DIRECT SERPL-MCNC: 0.3 MG/DL (ref 0–0.2)
BILIRUB SERPL-MCNC: 0.7 MG/DL (ref 0.2–1)
BUN SERPL-MCNC: 14 MG/DL (ref 6–20)
BUN/CREAT SERPL: 27 (ref 12–20)
CALCIUM SERPL-MCNC: 9.2 MG/DL (ref 8.5–10.1)
CHLORIDE SERPL-SCNC: 106 MMOL/L (ref 97–108)
CO2 SERPL-SCNC: 27 MMOL/L (ref 21–32)
CREAT SERPL-MCNC: 0.52 MG/DL (ref 0.55–1.02)
DIFFERENTIAL METHOD BLD: ABNORMAL
EOSINOPHIL # BLD: 0.1 K/UL (ref 0–0.4)
EOSINOPHIL NFR BLD: 2 % (ref 0–7)
ERYTHROCYTE [DISTWIDTH] IN BLOOD BY AUTOMATED COUNT: 12.9 % (ref 11.5–14.5)
GLOBULIN SER CALC-MCNC: 3.9 G/DL (ref 2–4)
GLUCOSE SERPL-MCNC: 100 MG/DL (ref 65–100)
HCT VFR BLD AUTO: 33.3 % (ref 35–47)
HGB BLD-MCNC: 10.9 G/DL (ref 11.5–16)
IMM GRANULOCYTES # BLD AUTO: 0 K/UL (ref 0–0.04)
IMM GRANULOCYTES NFR BLD AUTO: 0 % (ref 0–0.5)
LYMPHOCYTES # BLD: 2.1 K/UL (ref 0.8–3.5)
LYMPHOCYTES NFR BLD: 47 % (ref 12–49)
MCH RBC QN AUTO: 32.2 PG (ref 26–34)
MCHC RBC AUTO-ENTMCNC: 32.7 G/DL (ref 30–36.5)
MCV RBC AUTO: 98.2 FL (ref 80–99)
MONOCYTES # BLD: 0.4 K/UL (ref 0–1)
MONOCYTES NFR BLD: 9 % (ref 5–13)
NEUTS SEG # BLD: 1.8 K/UL (ref 1.8–8)
NEUTS SEG NFR BLD: 41 % (ref 32–75)
NRBC # BLD: 0 K/UL (ref 0–0.01)
NRBC BLD-RTO: 0 PER 100 WBC
PLATELET # BLD AUTO: 149 K/UL (ref 150–400)
PMV BLD AUTO: 10.3 FL (ref 8.9–12.9)
POTASSIUM SERPL-SCNC: 3.5 MMOL/L (ref 3.5–5.1)
PROT SERPL-MCNC: 6.6 G/DL (ref 6.4–8.2)
RBC # BLD AUTO: 3.39 M/UL (ref 3.8–5.2)
SODIUM SERPL-SCNC: 138 MMOL/L (ref 136–145)
WBC # BLD AUTO: 4.4 K/UL (ref 3.6–11)

## 2022-11-07 PROCEDURE — 74011250637 HC RX REV CODE- 250/637: Performed by: INTERNAL MEDICINE

## 2022-11-07 PROCEDURE — 80076 HEPATIC FUNCTION PANEL: CPT

## 2022-11-07 PROCEDURE — 74011250636 HC RX REV CODE- 250/636: Performed by: NURSE PRACTITIONER

## 2022-11-07 PROCEDURE — 85025 COMPLETE CBC W/AUTO DIFF WBC: CPT

## 2022-11-07 PROCEDURE — 74018 RADEX ABDOMEN 1 VIEW: CPT

## 2022-11-07 PROCEDURE — 36415 COLL VENOUS BLD VENIPUNCTURE: CPT

## 2022-11-07 PROCEDURE — 80048 BASIC METABOLIC PNL TOTAL CA: CPT

## 2022-11-07 PROCEDURE — G0378 HOSPITAL OBSERVATION PER HR: HCPCS

## 2022-11-07 PROCEDURE — 96376 TX/PRO/DX INJ SAME DRUG ADON: CPT

## 2022-11-07 PROCEDURE — 74011250636 HC RX REV CODE- 250/636: Performed by: STUDENT IN AN ORGANIZED HEALTH CARE EDUCATION/TRAINING PROGRAM

## 2022-11-07 PROCEDURE — 74011250637 HC RX REV CODE- 250/637: Performed by: STUDENT IN AN ORGANIZED HEALTH CARE EDUCATION/TRAINING PROGRAM

## 2022-11-07 PROCEDURE — 74011250637 HC RX REV CODE- 250/637: Performed by: NURSE PRACTITIONER

## 2022-11-07 PROCEDURE — 74011000250 HC RX REV CODE- 250: Performed by: STUDENT IN AN ORGANIZED HEALTH CARE EDUCATION/TRAINING PROGRAM

## 2022-11-07 RX ADMIN — KETOROLAC TROMETHAMINE 15 MG: 30 INJECTION, SOLUTION INTRAMUSCULAR at 10:53

## 2022-11-07 RX ADMIN — Medication 1 CAPSULE: at 09:27

## 2022-11-07 RX ADMIN — SODIUM CHLORIDE, POTASSIUM CHLORIDE, SODIUM LACTATE AND CALCIUM CHLORIDE 150 ML/HR: 600; 310; 30; 20 INJECTION, SOLUTION INTRAVENOUS at 03:02

## 2022-11-07 RX ADMIN — DICYCLOMINE HYDROCHLORIDE 10 MG: 10 CAPSULE ORAL at 21:30

## 2022-11-07 RX ADMIN — BACLOFEN 10 MG: 10 TABLET ORAL at 16:00

## 2022-11-07 RX ADMIN — BACLOFEN 10 MG: 10 TABLET ORAL at 09:28

## 2022-11-07 RX ADMIN — DILTIAZEM HYDROCHLORIDE 60 MG: 60 TABLET, FILM COATED ORAL at 09:28

## 2022-11-07 RX ADMIN — DICYCLOMINE HYDROCHLORIDE 10 MG: 10 CAPSULE ORAL at 16:00

## 2022-11-07 RX ADMIN — DULOXETINE HYDROCHLORIDE 60 MG: 30 CAPSULE, DELAYED RELEASE ORAL at 21:31

## 2022-11-07 RX ADMIN — DICYCLOMINE HYDROCHLORIDE 10 MG: 10 CAPSULE ORAL at 09:29

## 2022-11-07 RX ADMIN — EZETIMIBE 5 MG: 10 TABLET ORAL at 09:28

## 2022-11-07 RX ADMIN — BACLOFEN 10 MG: 10 TABLET ORAL at 21:31

## 2022-11-07 RX ADMIN — FLUTICASONE PROPIONATE 2 SPRAY: 50 SPRAY, METERED NASAL at 09:37

## 2022-11-07 RX ADMIN — LORAZEPAM 1 MG: 1 TABLET ORAL at 21:30

## 2022-11-07 RX ADMIN — LOSARTAN POTASSIUM 100 MG: 100 TABLET, FILM COATED ORAL at 09:28

## 2022-11-07 RX ADMIN — ONDANSETRON 4 MG: 2 INJECTION INTRAMUSCULAR; INTRAVENOUS at 10:57

## 2022-11-07 RX ADMIN — LORAZEPAM 1 MG: 1 TABLET ORAL at 09:28

## 2022-11-07 RX ADMIN — FAMOTIDINE 20 MG: 10 INJECTION, SOLUTION INTRAVENOUS at 09:29

## 2022-11-07 RX ADMIN — GABAPENTIN 600 MG: 300 CAPSULE ORAL at 21:31

## 2022-11-07 RX ADMIN — ASPIRIN 325 MG: 325 TABLET, COATED ORAL at 18:32

## 2022-11-07 RX ADMIN — ASPIRIN 325 MG: 325 TABLET, COATED ORAL at 09:29

## 2022-11-07 RX ADMIN — ATORVASTATIN CALCIUM 80 MG: 40 TABLET, FILM COATED ORAL at 18:32

## 2022-11-07 RX ADMIN — FAMOTIDINE 20 MG: 10 INJECTION, SOLUTION INTRAVENOUS at 21:33

## 2022-11-07 RX ADMIN — KETOROLAC TROMETHAMINE 15 MG: 30 INJECTION, SOLUTION INTRAMUSCULAR at 18:32

## 2022-11-07 RX ADMIN — DILTIAZEM HYDROCHLORIDE 60 MG: 60 TABLET, FILM COATED ORAL at 18:31

## 2022-11-07 RX ADMIN — DULOXETINE HYDROCHLORIDE 60 MG: 30 CAPSULE, DELAYED RELEASE ORAL at 09:28

## 2022-11-07 RX ADMIN — TRAZODONE HYDROCHLORIDE 100 MG: 100 TABLET ORAL at 21:31

## 2022-11-07 RX ADMIN — CETIRIZINE HYDROCHLORIDE 10 MG: 10 TABLET, FILM COATED ORAL at 09:28

## 2022-11-07 RX ADMIN — LORAZEPAM 1 MG: 1 TABLET ORAL at 16:00

## 2022-11-07 NOTE — CONSULTS
Orthopedic consult note:      80-year-old female seen for evaluation of right hip. Consult reason \"right hip surgery? Cellulitis \". The patient is s/p right hip open excision of trochanteric bursa, repair of chronic abductor tendon tears, fasciotomy of IT band of the hip by Dr. Richard Torres at [facility] on 11/02/2022. Patient was admitted to our facility for a postoperative ileus. Patient states she is not having any increasing pain in her right hip. She denies any complications since her surgery. She denies any drainage or any other alarming symptoms at this time. The patient does admit to having a slight difficult time getting around while following her postoperative precautions. Exam: Inspection of the right hip reveals expected postoperative bruising and mild edema of the right thigh. There is a clear bandage in place over the right hip with no signs of active drainage. No obvious erythema or signs of infection noted on exam.  Thigh is soft to palpation. Active range of motion with flexion of the knee and hip intact. 5/5 strength in the right lower extremity with hip flexion, knee flexion, and knee extension. Sensation to light touch intact right lower extremity. Plan:  -No plans for further work-up or intervention from orthopedic standpoint.  -Patient appears to be doing well postoperatively with physical his exam findings all expected and normal 5 days postoperatively.  -We will sign off from orthopedic standpoint. Continue any precautions given by Dr. Richard Torres postoperatively.  -Follow-up with Dr. Richard Torres as scheduled. Dr. Mejia Records aware of the above and agrees.   BECKY Baldwin

## 2022-11-07 NOTE — PROGRESS NOTES
Reason for Admission:  Ileus                     RUR Score:  9%                   Plan for utilizing home health:  Agreeable        PCP: First and Last name:  Abi Mcgraw MD     Name of Practice:    Are you a current patient: Yes/No:    Approximate date of last visit: 2 weeks ago   Can you participate in a virtual visit with your PCP:                     Current Advanced Directive/Advance Care Plan: Full Code  CM confirmed patient is a Full Code    Healthcare Decision Maker:   Click here to complete [address] including selection of the Healthcare Decision Maker Relationship (ie \"Primary\")           , Emi Cohen, [phone]                  Transition of Care Plan:                    Patient lives at home with . Patient has a walker at home, toilet riser, shower chair and wheelchair. Patient would like [facility] at discharge. Patient's  will be her transport. Patient uses the [facility] in [facility].   Current Dispo: Home with [facility]
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
## 2022-11-07 NOTE — PROGRESS NOTES
[facility] of Shift Note    Bedside shift change report given to [address] [place] [address] (oncoming nurse) by David Gray RN (offgoing nurse). Report included the following information SBAR, Kardex, MAR, and Recent Results    Shift worked:  Night     Shift summary and any significant changes: It was the wish of the patient to have quiet time to res. Meds admin and all necessary procedures were done at the same time then she got time to sleep. No other new changes observed. No c/o of pain   Concerns for physician to address:       Zone phone for oncoming shift:          Activity:  Activity Level: Bed Rest  Number times ambulated in hallways past shift: 0  Number of times OOB to chair past shift: 3    Cardiac:   Cardiac Monitoring: No      Cardiac Rhythm: Sinus Tachy, Sinus Rhythm    Access:  Current line(s): midline     Genitourinary:   Urinary status: voiding    Respiratory:   O2 Device: None (Room air)  Chronic home O2 use?: NO  Incentive spirometer at bedside: NO       GI:  Last Bowel Movement Date: 11/06/22  Current diet:  DIET NPO  Passing flatus: YES  Tolerating current diet: YES       Pain Management:   Patient states pain is manageable on current regimen: YES    Skin:  Blair Score: 18  Interventions: nutritional support     Patient Safety:  Fall Score:  Total Score: 3  Interventions: gripper socks  High Fall Risk: Yes    Length of Stay:  Expected LOS: - - -  Actual LOS: 1      David Gray RN

## 2022-11-07 NOTE — PROGRESS NOTES
End of Shift Note    Bedside shift change report given to [address] (oncoming nurse) by Delvin Pak RN (offgoing nurse). Report included the following information SBAR, Kardex, and MAR    Shift worked:  7a-7p     Shift summary and any significant changes:     Patient lost IV access overnight. ED called to place new IV. Scheduled meds given and caring rounding completed. Medication education provided. NP Marilee notified of patient's home medications and dosages that were different in [facility] Ortho surgery and Gastroenterology consults called. Dr. Mary Grace Dow from [facility] and Dr. Saintclair Basset to see patient in morning. Patient is NPO except for medications and ice chips. Patient's nausea treated with 3 doses of PRN antinausea medication, see MAR. Patient's pain treated with 2 doses of PRN pain medication, see MAR. Patient had frequent loose Bms throughout shift. Patient up 1x assist to bedside commode and on hip precautions. Patient evaluated by PT today. Concerns for physician to address:  Patient's continued abdominal pain, diarrhea, and nausea.          Delvin Pak RN

## 2022-11-07 NOTE — PROGRESS NOTES
Hospitalist Progress Note    Subjective:   Daily Progress Note: 11/7/2022 8:58 AM    Patient still having looser bowel movements with some nausea without vomiting or abdominal pain. Current Facility-Administered Medications   Medication Dose Route Frequency    LORazepam (ATIVAN) tablet 1 mg  1 mg Oral TID    fluticasone propionate (FLONASE) 50 mcg/actuation nasal spray 2 Spray  2 Spray Both Nostrils DAILY    oxyCODONE-acetaminophen (PERCOCET) 5-325 mg per tablet 1 Tablet  1 Tablet Oral Q8H PRN    traZODone (DESYREL) tablet 100 mg  100 mg Oral QHS    dilTIAZem IR (CARDIZEM) tablet 60 mg  60 mg Oral BID    cyclobenzaprine (FLEXERIL) tablet 10 mg  10 mg Oral QHS    losartan (COZAAR) tablet 100 mg  100 mg Oral DAILY    L.acidophilus-paracasei-S.thermophil-bifidobacter (RISAQUAD) 8 billion cell capsule  1 Capsule Oral DAILY    cetirizine (ZYRTEC) tablet 10 mg  10 mg Oral DAILY    zolpidem (AMBIEN) tablet 5 mg  5 mg Oral QHS PRN    ketorolac (TORADOL) injection 15 mg  15 mg IntraVENous Q6H PRN    baclofen (LIORESAL) tablet 10 mg  10 mg Oral TID    DULoxetine (CYMBALTA) capsule 60 mg  60 mg Oral BID    gabapentin (NEURONTIN) capsule 600 mg  600 mg Oral QHS    aspirin delayed-release tablet 325 mg  325 mg Oral BID    atorvastatin (LIPITOR) tablet 80 mg  80 mg Oral QPM    dicyclomine (BENTYL) capsule 10 mg  10 mg Oral TID    ezetimibe (ZETIA) tablet 5 mg  5 mg Oral DAILY    ondansetron (ZOFRAN) injection 4 mg  4 mg IntraVENous Q4H PRN    famotidine (PF) (PEPCID) 20 mg in 0.9% sodium chloride 10 mL injection  20 mg IntraVENous Q12H    lactated Ringers infusion  150 mL/hr IntraVENous CONTINUOUS    fentaNYL citrate (PF) injection 50 mcg  50 mcg IntraVENous Q4H PRN        Review of Systems  Review of Systems   Constitutional: Negative. Respiratory: Negative. Cardiovascular: Negative. Gastrointestinal:  Positive for diarrhea. Negative for abdominal pain, nausea and vomiting. Genitourinary: Negative.     All other systems reviewed and are negative. Objective:     Visit Vitals  BP (!) 155/104 (BP 1 Location: Right upper arm, BP Patient Position: Sitting)   Pulse (!) 104   Temp 98.1 °F (36.7 °C)   Resp 18   Ht 5' 5\" (1.651 m)   Wt 82.1 kg (181 lb)   SpO2 97%   BMI 30.12 kg/m²      O2 Device: None (Room air)    Temp (24hrs), Av.6 °F (37 °C), Min:98.1 °F (36.7 °C), Max:99 °F (37.2 °C)      No intake/output data recorded. No intake/output data recorded. US ABD LTD   Final Result   Echogenic liver compatible with fatty infiltration      CT ABD PELV W CONT   Final Result   Ileus pattern of the bowels. Trace ascites. Right lateral hip region   presumed postsurgical findings. PHYSICAL EXAM:    Physical Exam  Vitals and nursing note reviewed. HENT:      Head: Normocephalic and atraumatic. Cardiovascular:      Rate and Rhythm: Normal rate and regular rhythm. Pulmonary:      Effort: No respiratory distress. Breath sounds: No wheezing. Abdominal:      General: Bowel sounds are normal. There is no distension. Palpations: Abdomen is soft. There is no mass. Tenderness: There is no abdominal tenderness. Musculoskeletal:      Right lower leg: No edema. Left lower leg: No edema. Skin:     General: Skin is warm. Capillary Refill: Capillary refill takes less than 2 seconds. Neurological:      Mental Status: She is alert and oriented to person, place, and time.    Psychiatric:         Mood and Affect: Mood normal.        Data Review    Recent Results (from the past 24 hour(s))   CBC WITH AUTOMATED DIFF    Collection Time: 22  5:40 AM   Result Value Ref Range    WBC 4.4 3.6 - 11.0 K/uL    RBC 3.39 (L) 3.80 - 5.20 M/uL    HGB 10.9 (L) 11.5 - 16.0 g/dL    HCT 33.3 (L) 35.0 - 47.0 %    MCV 98.2 80.0 - 99.0 FL    MCH 32.2 26.0 - 34.0 PG    MCHC 32.7 30.0 - 36.5 g/dL    RDW 12.9 11.5 - 14.5 %    PLATELET 610 (L) 599 - 400 K/uL    MPV 10.3 8.9 - 12.9 FL    NRBC 0.0 0  WBC ABSOLUTE NRBC 0.00 0.00 - 0.01 K/uL    NEUTROPHILS 41 32 - 75 %    LYMPHOCYTES 47 12 - 49 %    MONOCYTES 9 5 - 13 %    EOSINOPHILS 2 0 - 7 %    BASOPHILS 1 0 - 1 %    IMMATURE GRANULOCYTES 0 0.0 - 0.5 %    ABS. NEUTROPHILS 1.8 1.8 - 8.0 K/UL    ABS. LYMPHOCYTES 2.1 0.8 - 3.5 K/UL    ABS. MONOCYTES 0.4 0.0 - 1.0 K/UL    ABS. EOSINOPHILS 0.1 0.0 - 0.4 K/UL    ABS. BASOPHILS 0.0 0.0 - 0.1 K/UL    ABS. IMM. GRANS. 0.0 0.00 - 0.04 K/UL    DF AUTOMATED     METABOLIC PANEL, BASIC    Collection Time: 11/07/22  5:40 AM   Result Value Ref Range    Sodium 138 136 - 145 mmol/L    Potassium 3.5 3.5 - 5.1 mmol/L    Chloride 106 97 - 108 mmol/L    CO2 27 21 - 32 mmol/L    Anion gap 5 5 - 15 mmol/L    Glucose 100 65 - 100 mg/dL    BUN 14 6 - 20 MG/DL    Creatinine 0.52 (L) 0.55 - 1.02 MG/DL    BUN/Creatinine ratio 27 (H) 12 - 20      eGFR >60 >60 ml/min/1.73m2    Calcium 9.2 8.5 - 10.1 MG/DL   HEPATIC FUNCTION PANEL    Collection Time: 11/07/22  5:40 AM   Result Value Ref Range    Protein, total 6.6 6.4 - 8.2 g/dL    Albumin 2.7 (L) 3.5 - 5.0 g/dL    Globulin 3.9 2.0 - 4.0 g/dL    A-G Ratio 0.7 (L) 1.1 - 2.2      Bilirubin, total 0.7 0.2 - 1.0 MG/DL    Bilirubin, direct 0.3 (H) 0.0 - 0.2 MG/DL    Alk. phosphatase 69 45 - 117 U/L    AST (SGOT) 34 15 - 37 U/L    ALT (SGPT) 30 12 - 78 U/L        Assessment/Plan:     Active Problems:    Ileus (HCC) (11/5/2022)      Abdominal pain (11/5/2022)        Hospital Course:    Brooklyn Vences is a 58year old female with a PMH of HTN, GERD, chronic pain, and PUD who presented with abdominal pain and vomiting. In ED vital signs unremarkable. Initial labs significant for elevated LFTs. CT abdomen pelvis showed ileus pattern of bowels with right lateral hip region presumed post surgical findings. Abdominal ultrasound showed echogenic liver compatible with fatty infiltration. Patient admitted for further management. Patient started on IV fluids. GI and ortho consulted.  KUB showed no definitive evidence of small bowel obstruction or ileus. Patient's diet advanced. Ileus, postop  Currently tolerating clear liquids will advance as tolerated  Continue on IV pepcid and bentyl  Avoid opioid and anticholingeric medications    Transaminitis  LFTs down-trending  Hepatitis panel normal  Abdominal ultrasound with echogenic liver  Avoid hepatotoxic medications  GI following    CAD   Continue on aspirin, atorvastatin, diltiazem and losartan    Anxiety   Continue on duloxetine, trazodone, and ativan    Chronic pain with muscle spasms  S/p ORIF on 11/2/2022  Continue on cyclobenzaprine, baclofen, and gabapentin  Ortho consulted    History of gastroparesis  s/p total colectomy with ileorectal anastomosis 2015     DVT Prophylaxis: SCDs  GI Prophylaxis: tolerating po diet  Discharge and disposition barriers: tolerating po diet, 24hrs    Care Plan discussed with: Patient/Family, Nurse, and     Total time spent with patient: 35 minutes.

## 2022-11-07 NOTE — PROGRESS NOTES
1552 - Delivered MOON to patient. See scanned documents. Rosalba Landeros     Attempted to deliver and verbally explain the [facility] with patient. Patient was with clinical staff.  Leanord Care, Care Management Assistant

## 2022-11-07 NOTE — PROGRESS NOTES
Occupational Therapy    Orders acknowledged, chart reviewed. Pt received alert and oriented x4 in bed with  at bedside. Introduced self and role of acute OT. Note PT evaluated and discharged as pt is at baseline. Pt currently NWB RLE with pt reported restrictions of no abduction or IR. Pt reports she is performing bed mobility with leg  and transferring to [facility] with  supervision/assistance; declined formal evaluation. Pt and  voicing concerns over home accessibility due to steps to enter. Therapist provided pt  information on local [employer] company to call and inquire. Pt and  confirm no need for formal OT evaluation at this time. Will sign off.      Thanks,    Fredy Ramirez MS, OTR/L  Total time: 15 minutes

## 2022-11-08 ENCOUNTER — APPOINTMENT (OUTPATIENT)
Dept: GENERAL RADIOLOGY | Age: 62
End: 2022-11-08
Attending: STUDENT IN AN ORGANIZED HEALTH CARE EDUCATION/TRAINING PROGRAM
Payer: MEDICARE

## 2022-11-08 PROBLEM — E55.9 VITAMIN D DEFICIENCY: Status: ACTIVE | Noted: 2022-10-13

## 2022-11-08 PROBLEM — I25.10 ATHEROSCLEROTIC HEART DISEASE OF NATIVE CORONARY ARTERY WITHOUT ANGINA PECTORIS: Status: ACTIVE | Noted: 2022-10-13

## 2022-11-08 PROBLEM — I10 BENIGN ESSENTIAL HYPERTENSION: Status: ACTIVE | Noted: 2022-10-13

## 2022-11-08 PROBLEM — E78.5 HYPERLIPIDEMIA: Status: ACTIVE | Noted: 2020-10-08

## 2022-11-08 PROBLEM — R74.01 TRANSAMINITIS: Status: ACTIVE | Noted: 2022-11-08

## 2022-11-08 LAB
ALBUMIN SERPL-MCNC: 2.7 G/DL (ref 3.5–5)
ALBUMIN/GLOB SERPL: 0.8 {RATIO} (ref 1.1–2.2)
ALP SERPL-CCNC: 132 U/L (ref 45–117)
ALT SERPL-CCNC: 132 U/L (ref 12–78)
ANA SER QL: NEGATIVE
ANION GAP SERPL CALC-SCNC: 7 MMOL/L (ref 5–15)
AST SERPL-CCNC: 36 U/L (ref 15–37)
BASOPHILS # BLD: 0 K/UL (ref 0–0.1)
BASOPHILS NFR BLD: 1 % (ref 0–1)
BILIRUB SERPL-MCNC: 0.7 MG/DL (ref 0.2–1)
BUN SERPL-MCNC: 7 MG/DL (ref 6–20)
BUN/CREAT SERPL: 12 (ref 12–20)
CALCIUM SERPL-MCNC: 8.9 MG/DL (ref 8.5–10.1)
CHLORIDE SERPL-SCNC: 109 MMOL/L (ref 97–108)
CO2 SERPL-SCNC: 27 MMOL/L (ref 21–32)
CREAT SERPL-MCNC: 0.6 MG/DL (ref 0.55–1.02)
DIFFERENTIAL METHOD BLD: ABNORMAL
EOSINOPHIL # BLD: 0.1 K/UL (ref 0–0.4)
EOSINOPHIL NFR BLD: 3 % (ref 0–7)
ERYTHROCYTE [DISTWIDTH] IN BLOOD BY AUTOMATED COUNT: 13.7 % (ref 11.5–14.5)
GLOBULIN SER CALC-MCNC: 3.5 G/DL (ref 2–4)
GLUCOSE SERPL-MCNC: 104 MG/DL (ref 65–100)
HCT VFR BLD AUTO: 27.9 % (ref 35–47)
HGB BLD-MCNC: 9 G/DL (ref 11.5–16)
IMM GRANULOCYTES # BLD AUTO: 0 K/UL (ref 0–0.04)
IMM GRANULOCYTES NFR BLD AUTO: 0 % (ref 0–0.5)
LYMPHOCYTES # BLD: 1.2 K/UL (ref 0.8–3.5)
LYMPHOCYTES NFR BLD: 26 % (ref 12–49)
MCH RBC QN AUTO: 28.2 PG (ref 26–34)
MCHC RBC AUTO-ENTMCNC: 32.3 G/DL (ref 30–36.5)
MCV RBC AUTO: 87.5 FL (ref 80–99)
MONOCYTES # BLD: 0.3 K/UL (ref 0–1)
MONOCYTES NFR BLD: 7 % (ref 5–13)
NEUTS SEG # BLD: 2.8 K/UL (ref 1.8–8)
NEUTS SEG NFR BLD: 62 % (ref 32–75)
NRBC # BLD: 0 K/UL (ref 0–0.01)
NRBC BLD-RTO: 0 PER 100 WBC
PLATELET # BLD AUTO: 228 K/UL (ref 150–400)
PMV BLD AUTO: 9.3 FL (ref 8.9–12.9)
POTASSIUM SERPL-SCNC: 3.8 MMOL/L (ref 3.5–5.1)
PROT SERPL-MCNC: 6.2 G/DL (ref 6.4–8.2)
RBC # BLD AUTO: 3.19 M/UL (ref 3.8–5.2)
SODIUM SERPL-SCNC: 143 MMOL/L (ref 136–145)
WBC # BLD AUTO: 4.5 K/UL (ref 3.6–11)

## 2022-11-08 PROCEDURE — 74011250637 HC RX REV CODE- 250/637: Performed by: STUDENT IN AN ORGANIZED HEALTH CARE EDUCATION/TRAINING PROGRAM

## 2022-11-08 PROCEDURE — 85025 COMPLETE CBC W/AUTO DIFF WBC: CPT

## 2022-11-08 PROCEDURE — 74011250636 HC RX REV CODE- 250/636: Performed by: STUDENT IN AN ORGANIZED HEALTH CARE EDUCATION/TRAINING PROGRAM

## 2022-11-08 PROCEDURE — 74018 RADEX ABDOMEN 1 VIEW: CPT

## 2022-11-08 PROCEDURE — G0378 HOSPITAL OBSERVATION PER HR: HCPCS

## 2022-11-08 PROCEDURE — 96376 TX/PRO/DX INJ SAME DRUG ADON: CPT

## 2022-11-08 PROCEDURE — 80053 COMPREHEN METABOLIC PANEL: CPT

## 2022-11-08 PROCEDURE — 74011250636 HC RX REV CODE- 250/636: Performed by: NURSE PRACTITIONER

## 2022-11-08 PROCEDURE — 74011250637 HC RX REV CODE- 250/637: Performed by: NURSE PRACTITIONER

## 2022-11-08 PROCEDURE — 36415 COLL VENOUS BLD VENIPUNCTURE: CPT

## 2022-11-08 PROCEDURE — 74011000250 HC RX REV CODE- 250: Performed by: STUDENT IN AN ORGANIZED HEALTH CARE EDUCATION/TRAINING PROGRAM

## 2022-11-08 PROCEDURE — 74011250637 HC RX REV CODE- 250/637: Performed by: INTERNAL MEDICINE

## 2022-11-08 RX ORDER — TRIAMCINOLONE ACETONIDE 1 MG/G
2 OINTMENT TOPICAL 2 TIMES DAILY
COMMUNITY
Start: 2022-08-17

## 2022-11-08 RX ORDER — KETOROLAC TROMETHAMINE 10 MG/1
10 TABLET, FILM COATED ORAL
Qty: 30 TABLET | Refills: 0 | Status: SHIPPED | OUTPATIENT
Start: 2022-11-08

## 2022-11-08 RX ORDER — DICLOFENAC SODIUM 75 MG/1
1 TABLET, DELAYED RELEASE ORAL 2 TIMES DAILY
COMMUNITY
Start: 2022-10-13

## 2022-11-08 RX ORDER — PRUCALOPRIDE 2 MG/1
1 TABLET, FILM COATED ORAL DAILY
COMMUNITY
Start: 2022-10-14

## 2022-11-08 RX ADMIN — ATORVASTATIN CALCIUM 80 MG: 40 TABLET, FILM COATED ORAL at 17:52

## 2022-11-08 RX ADMIN — DULOXETINE HYDROCHLORIDE 60 MG: 30 CAPSULE, DELAYED RELEASE ORAL at 22:24

## 2022-11-08 RX ADMIN — ONDANSETRON 4 MG: 2 INJECTION INTRAMUSCULAR; INTRAVENOUS at 13:03

## 2022-11-08 RX ADMIN — EZETIMIBE 5 MG: 10 TABLET ORAL at 08:46

## 2022-11-08 RX ADMIN — Medication 1 CAPSULE: at 08:46

## 2022-11-08 RX ADMIN — ASPIRIN 325 MG: 325 TABLET, COATED ORAL at 17:52

## 2022-11-08 RX ADMIN — LORAZEPAM 1 MG: 1 TABLET ORAL at 15:08

## 2022-11-08 RX ADMIN — BACLOFEN 10 MG: 10 TABLET ORAL at 15:09

## 2022-11-08 RX ADMIN — ASPIRIN 325 MG: 325 TABLET, COATED ORAL at 08:46

## 2022-11-08 RX ADMIN — FLUTICASONE PROPIONATE 2 SPRAY: 50 SPRAY, METERED NASAL at 08:51

## 2022-11-08 RX ADMIN — BACLOFEN 10 MG: 10 TABLET ORAL at 22:22

## 2022-11-08 RX ADMIN — SODIUM CHLORIDE, POTASSIUM CHLORIDE, SODIUM LACTATE AND CALCIUM CHLORIDE 150 ML/HR: 600; 310; 30; 20 INJECTION, SOLUTION INTRAVENOUS at 08:43

## 2022-11-08 RX ADMIN — DILTIAZEM HYDROCHLORIDE 60 MG: 60 TABLET, FILM COATED ORAL at 17:52

## 2022-11-08 RX ADMIN — DICYCLOMINE HYDROCHLORIDE 10 MG: 10 CAPSULE ORAL at 22:24

## 2022-11-08 RX ADMIN — CETIRIZINE HYDROCHLORIDE 10 MG: 10 TABLET, FILM COATED ORAL at 08:46

## 2022-11-08 RX ADMIN — TRAZODONE HYDROCHLORIDE 100 MG: 100 TABLET ORAL at 22:24

## 2022-11-08 RX ADMIN — GABAPENTIN 600 MG: 300 CAPSULE ORAL at 22:22

## 2022-11-08 RX ADMIN — LORAZEPAM 1 MG: 1 TABLET ORAL at 08:46

## 2022-11-08 RX ADMIN — DICYCLOMINE HYDROCHLORIDE 10 MG: 10 CAPSULE ORAL at 08:46

## 2022-11-08 RX ADMIN — DULOXETINE HYDROCHLORIDE 60 MG: 30 CAPSULE, DELAYED RELEASE ORAL at 08:46

## 2022-11-08 RX ADMIN — LOSARTAN POTASSIUM 100 MG: 100 TABLET, FILM COATED ORAL at 08:47

## 2022-11-08 RX ADMIN — BACLOFEN 10 MG: 10 TABLET ORAL at 08:46

## 2022-11-08 RX ADMIN — DICYCLOMINE HYDROCHLORIDE 10 MG: 10 CAPSULE ORAL at 15:09

## 2022-11-08 RX ADMIN — KETOROLAC TROMETHAMINE 15 MG: 30 INJECTION, SOLUTION INTRAMUSCULAR at 14:42

## 2022-11-08 RX ADMIN — LORAZEPAM 1 MG: 1 TABLET ORAL at 22:24

## 2022-11-08 RX ADMIN — FAMOTIDINE 20 MG: 10 INJECTION, SOLUTION INTRAVENOUS at 22:22

## 2022-11-08 RX ADMIN — ONDANSETRON 4 MG: 2 INJECTION INTRAMUSCULAR; INTRAVENOUS at 08:46

## 2022-11-08 RX ADMIN — FAMOTIDINE 20 MG: 10 INJECTION, SOLUTION INTRAVENOUS at 08:46

## 2022-11-08 RX ADMIN — DILTIAZEM HYDROCHLORIDE 60 MG: 60 TABLET, FILM COATED ORAL at 08:46

## 2022-11-08 NOTE — PROGRESS NOTES
Attempted to schedule hospital follow up for PCP appointment. Sent a message to PCP office to find patient an appointment. PCP RN will contact patient directly.  Rosalba Landeros

## 2022-11-08 NOTE — PROGRESS NOTES
Richie Torres of Shift Note    Bedside shift change report given to [address] (oncoming nurse) by Rito Meyer RN (offgoing nurse). Report included the following information SBAR, Kardex, and MAR    Shift worked: 7a-7p     Shift summary and any significant changes:    Patient tolerating care fairly well. Medications given per [facility] and education provided. PRN Zofran given x1. PRN Toradol given x2. Pt had several episodes of loose stools, see flow sheet. Diet advanced from NPO to clear liquids, tolerated well so advanced to full liquids. Pt reported pain, swelling, and redness on the left forearm. Says she had a previous IV that may have infiltrated. [address] notified. Left arm elevated. Hourly rounding completed and patient is resting more comfortably at this time.           Rito Meyer RN

## 2022-11-08 NOTE — PROGRESS NOTES
Hospitalist Progress Note    Subjective:   Daily Progress Note: 11/8/2022 8:58 AM    Patient tolerated regular diet for breakfast but had a bout of abdominal pain for lunch. KUB pending. Current Facility-Administered Medications   Medication Dose Route Frequency    LORazepam (ATIVAN) tablet 1 mg  1 mg Oral TID    fluticasone propionate (FLONASE) 50 mcg/actuation nasal spray 2 Spray  2 Spray Both Nostrils DAILY    oxyCODONE-acetaminophen (PERCOCET) 5-325 mg per tablet 1 Tablet  1 Tablet Oral Q8H PRN    traZODone (DESYREL) tablet 100 mg  100 mg Oral QHS    dilTIAZem IR (CARDIZEM) tablet 60 mg  60 mg Oral BID    cyclobenzaprine (FLEXERIL) tablet 10 mg  10 mg Oral QHS    losartan (COZAAR) tablet 100 mg  100 mg Oral DAILY    L.acidophilus-paracasei-S.thermophil-bifidobacter (RISAQUAD) 8 billion cell capsule  1 Capsule Oral DAILY    cetirizine (ZYRTEC) tablet 10 mg  10 mg Oral DAILY    zolpidem (AMBIEN) tablet 5 mg  5 mg Oral QHS PRN    ketorolac (TORADOL) injection 15 mg  15 mg IntraVENous Q6H PRN    baclofen (LIORESAL) tablet 10 mg  10 mg Oral TID    DULoxetine (CYMBALTA) capsule 60 mg  60 mg Oral BID    gabapentin (NEURONTIN) capsule 600 mg  600 mg Oral QHS    aspirin delayed-release tablet 325 mg  325 mg Oral BID    atorvastatin (LIPITOR) tablet 80 mg  80 mg Oral QPM    dicyclomine (BENTYL) capsule 10 mg  10 mg Oral TID    ezetimibe (ZETIA) tablet 5 mg  5 mg Oral DAILY    ondansetron (ZOFRAN) injection 4 mg  4 mg IntraVENous Q4H PRN    famotidine (PF) (PEPCID) 20 mg in 0.9% sodium chloride 10 mL injection  20 mg IntraVENous Q12H    lactated Ringers infusion  150 mL/hr IntraVENous CONTINUOUS    fentaNYL citrate (PF) injection 50 mcg  50 mcg IntraVENous Q4H PRN        Review of Systems  Review of Systems   Constitutional: Negative. Respiratory: Negative. Cardiovascular: Negative. Gastrointestinal:  Positive for diarrhea. Negative for abdominal pain, nausea and vomiting. Genitourinary: Negative.     All other systems reviewed and are negative. Objective:     Visit Vitals  /86 (BP 1 Location: Left arm, BP Patient Position: At rest)   Pulse 79   Temp 98.5 °F (36.9 °C)   Resp 18   Ht 5' 5\" (1.651 m)   Wt 82.1 kg (181 lb)   SpO2 96%   BMI 30.12 kg/m²      O2 Device: None (Room air)    Temp (24hrs), Av.3 °F (36.8 °C), Min:97.9 °F (36.6 °C), Max:98.5 °F (36.9 °C)      No intake/output data recorded. No intake/output data recorded. XR ABD (KUB)   Final Result   No definite evidence of small bowel obstruction or ileus. Postsurgical changes as detailed. US ABD LTD   Final Result   Echogenic liver compatible with fatty infiltration      CT ABD PELV W CONT   Final Result   Ileus pattern of the bowels. Trace ascites. Right lateral hip region   presumed postsurgical findings. XR ABD (KUB)    (Results Pending)        PHYSICAL EXAM:    Physical Exam  Vitals and nursing note reviewed. HENT:      Head: Normocephalic and atraumatic. Cardiovascular:      Rate and Rhythm: Normal rate and regular rhythm. Pulmonary:      Effort: No respiratory distress. Breath sounds: No wheezing. Abdominal:      General: Bowel sounds are normal. There is no distension. Palpations: Abdomen is soft. There is no mass. Tenderness: There is no abdominal tenderness. Musculoskeletal:      Right lower leg: No edema. Left lower leg: No edema. Skin:     General: Skin is warm. Capillary Refill: Capillary refill takes less than 2 seconds. Neurological:      Mental Status: She is alert and oriented to person, place, and time.    Psychiatric:         Mood and Affect: Mood normal.        Data Review    Recent Results (from the past 24 hour(s))   CBC WITH AUTOMATED DIFF    Collection Time: 22  6:42 AM   Result Value Ref Range    WBC 4.5 3.6 - 11.0 K/uL    RBC 3.19 (L) 3.80 - 5.20 M/uL    HGB 9.0 (L) 11.5 - 16.0 g/dL    HCT 27.9 (L) 35.0 - 47.0 %    MCV 87.5 80.0 - 99.0 FL    MCH 28.2 26.0 - 34.0 PG    MCHC 32.3 30.0 - 36.5 g/dL    RDW 13.7 11.5 - 14.5 %    PLATELET 856 332 - 756 K/uL    MPV 9.3 8.9 - 12.9 FL    NRBC 0.0 0  WBC    ABSOLUTE NRBC 0.00 0.00 - 0.01 K/uL    NEUTROPHILS 62 32 - 75 %    LYMPHOCYTES 26 12 - 49 %    MONOCYTES 7 5 - 13 %    EOSINOPHILS 3 0 - 7 %    BASOPHILS 1 0 - 1 %    IMMATURE GRANULOCYTES 0 0.0 - 0.5 %    ABS. NEUTROPHILS 2.8 1.8 - 8.0 K/UL    ABS. LYMPHOCYTES 1.2 0.8 - 3.5 K/UL    ABS. MONOCYTES 0.3 0.0 - 1.0 K/UL    ABS. EOSINOPHILS 0.1 0.0 - 0.4 K/UL    ABS. BASOPHILS 0.0 0.0 - 0.1 K/UL    ABS. IMM. GRANS. 0.0 0.00 - 0.04 K/UL    DF AUTOMATED     METABOLIC PANEL, COMPREHENSIVE    Collection Time: 11/08/22  6:42 AM   Result Value Ref Range    Sodium 143 136 - 145 mmol/L    Potassium 3.8 3.5 - 5.1 mmol/L    Chloride 109 (H) 97 - 108 mmol/L    CO2 27 21 - 32 mmol/L    Anion gap 7 5 - 15 mmol/L    Glucose 104 (H) 65 - 100 mg/dL    BUN 7 6 - 20 MG/DL    Creatinine 0.60 0.55 - 1.02 MG/DL    BUN/Creatinine ratio 12 12 - 20      eGFR >60 >60 ml/min/1.73m2    Calcium 8.9 8.5 - 10.1 MG/DL    Bilirubin, total 0.7 0.2 - 1.0 MG/DL    ALT (SGPT) 132 (H) 12 - 78 U/L    AST (SGOT) 36 15 - 37 U/L    Alk. phosphatase 132 (H) 45 - 117 U/L    Protein, total 6.2 (L) 6.4 - 8.2 g/dL    Albumin 2.7 (L) 3.5 - 5.0 g/dL    Globulin 3.5 2.0 - 4.0 g/dL    A-G Ratio 0.8 (L) 1.1 - 2.2          Assessment/Plan:     Active Problems:    Ileus ([address] (11/5/2022)      Abdominal pain (11/5/2022)      Transaminitis (11/8/2022)      Hospital Course:    Darius Miles is a 58year old female with a PMH of HTN, GERD, chronic pain, and PUD who presented with abdominal pain and vomiting. In ED vital signs unremarkable. Initial labs significant for elevated LFTs. CT abdomen pelvis showed ileus pattern of bowels with right lateral hip region presumed post surgical findings. Abdominal ultrasound showed echogenic liver compatible with fatty infiltration. Patient admitted for further management.  Patient started on IV fluids. GI and ortho consulted. KUB showed no definitive evidence of small bowel obstruction or ileus. Patient's diet advanced did not seem to tolerate lunch. KUB pending. If ileus reoccurs will reduce diet to clear liquids and re-consult GI. Ileus, postop  Currently tolerated regular diet for breakfast but not lunch - will advance as tolerated  KUB pending - if ileus reoccurs will reduce diet to clear liquids and re-consult GI  Continue on IV pepcid and bentyl  Avoid opioid and anticholingeric medications    Transaminitis  LFTs down-trending  Hepatitis panel normal  Abdominal ultrasound with echogenic liver  Avoid hepatotoxic medications  GI following    CAD   Continue on aspirin, atorvastatin, diltiazem and losartan    Anxiety   Continue on duloxetine, trazodone, and ativan    Chronic pain with muscle spasms  S/p ORIF on 11/2/2022  Continue on cyclobenzaprine, baclofen, and gabapentin  Ortho consulted    History of gastroparesis  s/p total colectomy with ileorectal anastomosis 2015     DVT Prophylaxis: SCDs  GI Prophylaxis: tolerating po diet  Discharge and disposition barriers: tolerating po diet, 24hrs    Care Plan discussed with: Patient/Family, Nurse, and     Total time spent with patient: 35 minutes.

## 2022-11-08 NOTE — PROGRESS NOTES
Dusty Barreto of Shift Note    Bedside shift change report given to [address]  (oncoming nurse) by Navin Celis RN (offgoing nurse). Report included the following information SBAR, Kardex, and MAR    Shift worked: 7a-7p     Shift summary and any significant changes:    Patient tolerated breakfast this morning. She did have abdominal pain following lunch. Sneha PENA informed and ordered a repeat KUB. Diet ordered changed from easy to chew to full liquid. Per provider, we will see how she tolerates dinner, see results of KUB, and decided whether or not to re consult GI. Discharge cancelled for today and reordered for possibly tomorrow. PRN toradol given x1. PRN zofran given x1. Hourly rounding completed and patient is resting quietly at this time.           Navin Celis RN

## 2022-11-08 NOTE — DISCHARGE SUMMARY
Admit date: 11/5/2022   Admitting Provider: Michelle Hickey MD    Discharge date: 11/8/2022  Discharging Provider: BECKY Persaud      * Admission Diagnoses: Abdominal pain [R10.9]  Ileus [facility]) [K56.7]    * Discharge Diagnoses:    Hospital Problems as of 11/8/2022 Date Reviewed: 8/30/2017            Codes Class Noted - Resolved POA    Transaminitis ICD-10-CM: R74.01  ICD-9-CM: 790.4  11/8/2022 - Present Unknown        Ileus ([facility] [address] ICD-10-CM: K56.7  ICD-9-CM: 560.1  11/5/2022 - Present Unknown        Abdominal pain ICD-10-CM: R10.9  ICD-9-CM: 789.00  11/5/2022 - Present Unknown           * Hospital Course:     Grarison Coleman is a 58year old female with a PMH of HTN, GERD, chronic pain, recent hip surgery, and PUD who presented with abdominal pain and vomiting. In ED vital signs unremarkable. Initial labs significant for elevated LFTs. CT abdomen pelvis showed ileus pattern of bowels with right lateral hip region presumed post surgical findings. Abdominal ultrasound showed echogenic liver compatible with fatty infiltration. Patient admitted for further management. Patient started on IV fluids. GI and ortho consulted. KUB showed no definitive evidence of small bowel obstruction or ileus. Patient's diet advanced to regular which patient tolerated. Patient educated on increased use of NSAIDs with PUD. Patient to follow-up with ortho and GI as needed. Patient to follow-up with PCP within two weeks from discharge. All questions answered at time of encounter. * Procedures:   * No surgery found *      Consults:   Ortho and GI    Significant Diagnostic Studies: As discussed in hospital course    Discharge Exam:  Visit Vitals  /86 (BP 1 Location: Left arm, BP Patient Position: At rest)   Pulse 79   Temp 98.5 °F (36.9 °C)   Resp 18   Ht 5' 5\" (1.651 m)   Wt 82.1 kg (181 lb)   SpO2 96%   BMI 30.12 kg/m²       PHYSICAL EXAM:  Vitals and nursing note reviewed.    HENT:      Head: Normocephalic and atraumatic. Cardiovascular:      Rate and Rhythm: Normal rate and regular rhythm. Pulmonary:      Effort: No respiratory distress. Breath sounds: No wheezing. Abdominal:      General: Bowel sounds are normal. There is no distension. Palpations: Abdomen is soft. There is no mass. Tenderness: There is no abdominal tenderness. Musculoskeletal:      Right lower leg: No edema. Dressing clean, dry and intact with postoperative bruising. Left lower leg: No edema. Skin:     General: Skin is warm. Capillary Refill: Capillary refill takes less than 2 seconds. Neurological:      Mental Status: She is alert and oriented to person, place, and time. Psychiatric:         Mood and Affect: Mood normal.       * Discharge Condition: improved  * Disposition: Home    Discharge Medications:  Current Discharge Medication List        START taking these medications    Details   ketorolac (TORADOL) 10 mg tablet Take 1 Tablet by mouth every six (6) hours as needed for Pain. Qty: 30 Tablet, Refills: 0  Start date: 11/8/2022           CONTINUE these medications which have NOT CHANGED    Details   estradioL (ESTRACE) 0.01 % (0.1 mg/gram) vaginal cream Insert 2 g into vagina every Monday and Friday.      ezetimibe (ZETIA) 10 mg tablet Take 5 mg by mouth daily. LORazepam (ATIVAN) 1 mg tablet Take 1 mg by mouth three (3) times daily. gabapentin (NEURONTIN) 600 mg tablet Take 600 mg by mouth nightly. dilTIAZem IR (CARDIZEM) 60 mg tablet Take 60 mg by mouth two (2) times a day. famotidine (PEPCID) 40 mg tablet Take 40 mg by mouth daily. omeprazole (PRILOSEC) 40 mg capsule Take 40 mg by mouth daily. cyclobenzaprine (FLEXERIL) 10 mg tablet Take 10 mg by mouth. baclofen (LIORESAL) 10 mg tablet Take 10 mg by mouth three (3) times daily. AT 2PM      DULoxetine (CYMBALTA) 30 mg capsule Take 60 mg by mouth two (2) times a day.       diclofenac EC (VOLTAREN) 75 mg EC tablet Take 1 Tablet by mouth two (2) times a day. Motegrity 2 mg tab Take 1 Tablet by mouth daily. triamcinolone acetonide (KENALOG) 0.1 % ointment Apply 2 g to affected area two (2) times a day. evolocumab (REPATHA SURECLICK) pen injection 772 mg by SubCUTAneous route.      evening primrose oil 500 mg cap Take 1,000 mg by mouth daily. atorvastatin (LIPITOR) 80 mg tablet       aspirin delayed-release 81 mg tablet Take  by mouth daily. traZODone (DESYREL) 100 mg tablet Take 100 mg by mouth nightly. oxyCODONE-acetaminophen (PERCOCET) 5-325 mg per tablet Take 1 Tab by mouth every eight (8) hours as needed for Pain. Max Daily Amount: 3 Tabs. Qty: 15 Tab, Refills: 0      dicyclomine (BENTYL) 10 mg capsule Take 1 Cap by mouth Before breakfast, lunch, dinner and at bedtime. Qty: 120 Cap, Refills: 2      ondansetron (ZOFRAN ODT) 8 mg disintegrating tablet Take 1 Tab by mouth every eight (8) hours as needed for Nausea. Qty: 90 Tab, Refills: 1      co-enzyme Q-10 (CO Q-10) 100 mg capsule Take 100 mg by mouth daily (with dinner). Amelia Prim-Linoleic-Gamolenic Ac 1,000 mg cap Take 1 Cap by mouth daily (with breakfast). multivitamin (ONE A DAY) tablet Take 1 Tab by mouth daily. irbesartan (AVAPRO) 300 mg tablet Take 300 mg by mouth daily. loratadine (ALLERCLEAR) 10 mg tablet Take 10 mg by mouth daily. fluticasone (FLONASE) 50 mcg/actuation nasal spray 2 Sprays by Both Nostrils route daily. zolpidem (AMBIEN) 5 mg tablet Take 5 mg by mouth nightly. cholecalciferol, vitamin D3, (VITAMIN D3) 2,000 unit tab Take 2,000 Units by mouth daily. Bifidobacterium Infantis (ALIGN) 4 mg cap Take 1 Cap by mouth daily. promethazine (PHENERGAN) 25 mg tablet Take 25 mg by mouth every six (6) hours as needed for Nausea.            STOP taking these medications       ondansetron hcl (Zofran) 8 mg tablet Comments:   Reason for Stopping:         pantoprazole (PROTONIX) 40 mg tablet Comments:   Reason for Stopping:               * Follow-up Care/Patient Instructions:   Activity: PT/OT per Home Health  Diet: Cardiac Diet  Wound Care: Keep wound clean and dry and None needed    Follow-up Information       Follow up With Specialties Details Why Contact Info    Saud Andre MD Orthopedic Surgery Schedule an appointment as soon as possible for a visit As needed, Post-op follow-up [address] [place] [id number] [phone]      Ashley Smart MD Internal Medicine Physician Schedule an appointment as soon as possible for a visit in 1 week(s) Dr. Raquel Castro office will call you to schedule your PCP hospital follow up. [address] [facility] 70      John Branham MD Gastroenterology Schedule an appointment as soon as possible for a visit in 1 month(s) Post-hospitalization follow-up             Discharge summary greater than 35 minutes spent with the patient performing discharge instructions, medication review and physical exam    Signed:  BECKY Randall  11/8/2022  7:35 AM

## 2022-11-08 NOTE — PROGRESS NOTES
CM staffed case with clinical team regarding pts d/c.  CM informed that pt will d/c once diet is tolerated. Pt doesn't require additional needs at this time. Pts spouse will assist with transport home on today. CM completed the needs at this time.     Natali Linares, NURYS, [address] [place] [address]

## 2022-11-09 VITALS
OXYGEN SATURATION: 99 % | TEMPERATURE: 97.9 F | BODY MASS INDEX: 30.16 KG/M2 | SYSTOLIC BLOOD PRESSURE: 142 MMHG | RESPIRATION RATE: 18 BRPM | WEIGHT: 181 LBS | HEIGHT: 65 IN | DIASTOLIC BLOOD PRESSURE: 92 MMHG | HEART RATE: 84 BPM

## 2022-11-09 LAB — COPPER SERPL-MCNC: 118 UG/DL (ref 80–158)

## 2022-11-09 PROCEDURE — 74011250636 HC RX REV CODE- 250/636: Performed by: STUDENT IN AN ORGANIZED HEALTH CARE EDUCATION/TRAINING PROGRAM

## 2022-11-09 PROCEDURE — 74011000250 HC RX REV CODE- 250: Performed by: STUDENT IN AN ORGANIZED HEALTH CARE EDUCATION/TRAINING PROGRAM

## 2022-11-09 PROCEDURE — 74011250637 HC RX REV CODE- 250/637: Performed by: INTERNAL MEDICINE

## 2022-11-09 PROCEDURE — 74011250637 HC RX REV CODE- 250/637: Performed by: STUDENT IN AN ORGANIZED HEALTH CARE EDUCATION/TRAINING PROGRAM

## 2022-11-09 PROCEDURE — 96376 TX/PRO/DX INJ SAME DRUG ADON: CPT

## 2022-11-09 PROCEDURE — 74011250637 HC RX REV CODE- 250/637: Performed by: NURSE PRACTITIONER

## 2022-11-09 PROCEDURE — G0378 HOSPITAL OBSERVATION PER HR: HCPCS

## 2022-11-09 RX ADMIN — ASPIRIN 325 MG: 325 TABLET, COATED ORAL at 17:07

## 2022-11-09 RX ADMIN — LOSARTAN POTASSIUM 100 MG: 100 TABLET, FILM COATED ORAL at 09:57

## 2022-11-09 RX ADMIN — CETIRIZINE HYDROCHLORIDE 10 MG: 10 TABLET, FILM COATED ORAL at 09:57

## 2022-11-09 RX ADMIN — ATORVASTATIN CALCIUM 80 MG: 40 TABLET, FILM COATED ORAL at 17:07

## 2022-11-09 RX ADMIN — Medication 1 CAPSULE: at 09:57

## 2022-11-09 RX ADMIN — DICYCLOMINE HYDROCHLORIDE 10 MG: 10 CAPSULE ORAL at 17:06

## 2022-11-09 RX ADMIN — ASPIRIN 325 MG: 325 TABLET, COATED ORAL at 09:58

## 2022-11-09 RX ADMIN — BACLOFEN 10 MG: 10 TABLET ORAL at 09:57

## 2022-11-09 RX ADMIN — DILTIAZEM HYDROCHLORIDE 60 MG: 60 TABLET, FILM COATED ORAL at 09:58

## 2022-11-09 RX ADMIN — BACLOFEN 10 MG: 10 TABLET ORAL at 17:07

## 2022-11-09 RX ADMIN — DILTIAZEM HYDROCHLORIDE 60 MG: 60 TABLET, FILM COATED ORAL at 17:07

## 2022-11-09 RX ADMIN — DULOXETINE HYDROCHLORIDE 60 MG: 30 CAPSULE, DELAYED RELEASE ORAL at 09:56

## 2022-11-09 RX ADMIN — FLUTICASONE PROPIONATE 2 SPRAY: 50 SPRAY, METERED NASAL at 09:59

## 2022-11-09 RX ADMIN — DICYCLOMINE HYDROCHLORIDE 10 MG: 10 CAPSULE ORAL at 09:58

## 2022-11-09 RX ADMIN — LORAZEPAM 1 MG: 1 TABLET ORAL at 17:06

## 2022-11-09 RX ADMIN — EZETIMIBE 5 MG: 10 TABLET ORAL at 09:56

## 2022-11-09 RX ADMIN — LORAZEPAM 1 MG: 1 TABLET ORAL at 09:57

## 2022-11-09 RX ADMIN — FAMOTIDINE 20 MG: 10 INJECTION, SOLUTION INTRAVENOUS at 09:58

## 2022-11-09 NOTE — DISCHARGE SUMMARY
Admit date: 11/5/2022   Admitting Provider: Cyndie Tran MD    Discharge date: 11/9/2022  Discharging Provider: BECKY Blanco      * Admission Diagnoses: Abdominal pain [R10.9]  Ileus [facility]) [K56.7]    * Discharge Diagnoses:    Hospital Problems as of 11/9/2022 Date Reviewed: 8/30/2017            Codes Class Noted - Resolved POA    Transaminitis ICD-10-CM: R74.01  ICD-9-CM: 790.4  11/8/2022 - Present Unknown        Ileus ([facility] [address] ICD-10-CM: K56.7  ICD-9-CM: 560.1  11/5/2022 - Present Unknown        Abdominal pain ICD-10-CM: R10.9  ICD-9-CM: 789.00  11/5/2022 - Present Unknown           * Hospital Course:     Darius Miles is a 58year old female with a PMH of HTN, GERD, chronic pain, and PUD who presented with abdominal pain and vomiting. In ED vital signs unremarkable. Initial labs significant for elevated LFTs. CT abdomen pelvis showed ileus pattern of bowels with right lateral hip region presumed post surgical findings. Abdominal ultrasound showed echogenic liver compatible with fatty infiltration. Patient admitted for further management. Patient started on IV fluids. GI and ortho consulted. KUB showed no definitive evidence of small bowel obstruction or ileus. Patient's diet advanced did not seem to tolerate lunch. Repeat KUB showed nonspecific bowel gas pattern with improvement in small bowel  distention in the right abdomen. Patient tolerated a regular diet. Patient to follow-up with GI and PCP within two weeks from discharge. All questions answered at time of encounter.     * Procedures:   * No surgery found *      Consults:   GI and ortho    Significant Diagnostic Studies: As discussed in hospital course    Discharge Exam:  Visit Vitals  BP (!) 142/92 (BP 1 Location: Right upper arm, BP Patient Position: At rest)   Pulse 84   Temp 97.9 °F (36.6 °C)   Resp 18   Ht 5' 5\" (1.651 m)   Wt 82.1 kg (181 lb)   SpO2 99%   BMI 30.12 kg/m²       PHYSICAL EXAM:  Constitutional: Alert in no acute distress   HEENT: Sclerae anicteric, The neck is supple. Cardiovascular: Regular rate and rhythm. No murmurs, gallops, or rubs. Respiratory: Clear breath sounds with no wheezes, rales, or rhonchi. GI: Abdomen nondistended, soft, and nontender. Normal active bowel sounds. Rectal: Deferred   Musculoskeletal: No pitting edema of the lower legs. Extremities have good range of motion. Dressing clean, dry, and intact. No tenderness to palpation. Neurological:  Patient is alert and oriented. Cranial nerves II-XII intact  Psychiatric: Mood appears appropriate with judgement intact. Lymphatic: No cervical or supraclavicular adenopathy. Skin: No rashes or breakdown of the skin      * Discharge Condition: improved  * Disposition: Home    Discharge Medications:  Current Discharge Medication List        START taking these medications    Details   ketorolac (TORADOL) 10 mg tablet Take 1 Tablet by mouth every six (6) hours as needed for Pain. Qty: 30 Tablet, Refills: 0  Start date: 11/8/2022           CONTINUE these medications which have NOT CHANGED    Details   estradioL (ESTRACE) 0.01 % (0.1 mg/gram) vaginal cream Insert 2 g into vagina every Monday and Friday.      ezetimibe (ZETIA) 10 mg tablet Take 5 mg by mouth daily. LORazepam (ATIVAN) 1 mg tablet Take 1 mg by mouth three (3) times daily. gabapentin (NEURONTIN) 600 mg tablet Take 600 mg by mouth nightly. dilTIAZem IR (CARDIZEM) 60 mg tablet Take 60 mg by mouth two (2) times a day. famotidine (PEPCID) 40 mg tablet Take 40 mg by mouth daily. omeprazole (PRILOSEC) 40 mg capsule Take 40 mg by mouth daily. cyclobenzaprine (FLEXERIL) 10 mg tablet Take 10 mg by mouth. baclofen (LIORESAL) 10 mg tablet Take 10 mg by mouth three (3) times daily. AT 2PM      DULoxetine (CYMBALTA) 30 mg capsule Take 60 mg by mouth two (2) times a day. diclofenac EC (VOLTAREN) 75 mg EC tablet Take 1 Tablet by mouth two (2) times a day. Motegrity 2 mg tab Take 1 Tablet by mouth daily. triamcinolone acetonide (KENALOG) 0.1 % ointment Apply 2 g to affected area two (2) times a day. evolocumab (REPATHA SURECLICK) pen injection 837 mg by SubCUTAneous route.      evening primrose oil 500 mg cap Take 1,000 mg by mouth daily. atorvastatin (LIPITOR) 80 mg tablet       aspirin delayed-release 81 mg tablet Take  by mouth daily. traZODone (DESYREL) 100 mg tablet Take 100 mg by mouth nightly. oxyCODONE-acetaminophen (PERCOCET) 5-325 mg per tablet Take 1 Tab by mouth every eight (8) hours as needed for Pain. Max Daily Amount: 3 Tabs. Qty: 15 Tab, Refills: 0      dicyclomine (BENTYL) 10 mg capsule Take 1 Cap by mouth Before breakfast, lunch, dinner and at bedtime. Qty: 120 Cap, Refills: 2      ondansetron (ZOFRAN ODT) 8 mg disintegrating tablet Take 1 Tab by mouth every eight (8) hours as needed for Nausea. Qty: 90 Tab, Refills: 1      co-enzyme Q-10 (CO Q-10) 100 mg capsule Take 100 mg by mouth daily (with dinner). Amelia Prim-Linoleic-Gamolenic Ac 1,000 mg cap Take 1 Cap by mouth daily (with breakfast). multivitamin (ONE A DAY) tablet Take 1 Tab by mouth daily. irbesartan (AVAPRO) 300 mg tablet Take 300 mg by mouth daily. loratadine (ALLERCLEAR) 10 mg tablet Take 10 mg by mouth daily. fluticasone (FLONASE) 50 mcg/actuation nasal spray 2 Sprays by Both Nostrils route daily. zolpidem (AMBIEN) 5 mg tablet Take 5 mg by mouth nightly. cholecalciferol, vitamin D3, (VITAMIN D3) 2,000 unit tab Take 2,000 Units by mouth daily. Bifidobacterium Infantis (ALIGN) 4 mg cap Take 1 Cap by mouth daily. promethazine (PHENERGAN) 25 mg tablet Take 25 mg by mouth every six (6) hours as needed for Nausea.            STOP taking these medications       ondansetron hcl (Zofran) 8 mg tablet Comments:   Reason for Stopping:         pantoprazole (PROTONIX) 40 mg tablet Comments:   Reason for Stopping:               * Follow-up Care/Patient Instructions:   Activity: Activity as tolerated  Diet: Regular Diet  Wound Care: None needed    Follow-up Information       Follow up With Specialties Details Why Contact Info    Wilian Valencia MD Orthopedic Surgery Schedule an appointment as soon as possible for a visit As needed, Post-op follow-up [address] [place] [address] [phone]      Dany Salazar MD Internal Medicine Physician Schedule an appointment as soon as possible for a visit in 1 week(s) Dr. Teodora Hagan office will call you to schedule your PCP hospital follow up. [address] [facility] [facility] 70      Estuardo Galarza MD Gastroenterology Schedule an appointment as soon as possible for a visit in 1 month(s) Post-hospitalization follow-up     Rinku Cooper MD Gastroenterology Schedule an appointment as soon as possible for a visit As needed [address] [address]  [phone]              Discharge summary greater than 35 minutes spent with the patient performing discharge instructions, medication review and physical exam    Signed:  BECKY Gardner  11/9/2022  9:23 AM

## 2022-11-09 NOTE — PROGRESS NOTES
Pt tolerated dinner well with no issues, nausea or vomiting. Pt requested not to have VS done at 11p so she could sleep. Also states PA told her she didn't need labs today since she was going home. Pt rested well overnight and had decreased swelling the rt thigh.

## 2022-11-09 NOTE — PROGRESS NOTES
I have reviewed discharge instructions with the PATIENT PARENT GUARDIAN: patient. The patient verbalized understanding. Discharge medications reviewed with patient and appropriate educational materials and side effects teaching were provided. Follow-up appointments reviewed. Opportunity for questions and clarification was provided. Venous access removed without difficulty. Patient's belongings gathered and sent with patient. Patient is ready for discharge. Pt discharged home;  to provide transport. Pt tolerated regular diet well; had a BM before discharge.      Eleonora Naranjo RN

## 2023-09-11 ENCOUNTER — HOSPITAL ENCOUNTER (OUTPATIENT)
Facility: HOSPITAL | Age: 63
Setting detail: OUTPATIENT SURGERY
Discharge: HOME | End: 2023-09-11
Attending: INTERNAL MEDICINE | Admitting: INTERNAL MEDICINE
Payer: MEDICARE

## 2023-09-11 ENCOUNTER — ANESTHESIA (OUTPATIENT)
Facility: HOSPITAL | Age: 63
End: 2023-09-11
Payer: MEDICARE

## 2023-09-11 ENCOUNTER — ANESTHESIA EVENT (OUTPATIENT)
Facility: HOSPITAL | Age: 63
End: 2023-09-11
Payer: MEDICARE

## 2023-09-11 VITALS
RESPIRATION RATE: 9 BRPM | HEART RATE: 71 BPM | BODY MASS INDEX: 31.49 KG/M2 | OXYGEN SATURATION: 95 % | HEIGHT: 65 IN | WEIGHT: 189 LBS | SYSTOLIC BLOOD PRESSURE: 149 MMHG | DIASTOLIC BLOOD PRESSURE: 89 MMHG | TEMPERATURE: 98.5 F

## 2023-09-11 PROCEDURE — 7100000010 HC PHASE II RECOVERY - FIRST 15 MIN: Performed by: INTERNAL MEDICINE

## 2023-09-11 PROCEDURE — 88305 TISSUE EXAM BY PATHOLOGIST: CPT

## 2023-09-11 PROCEDURE — 3700000000 HC ANESTHESIA ATTENDED CARE: Performed by: INTERNAL MEDICINE

## 2023-09-11 PROCEDURE — 6360000002 HC RX W HCPCS: Performed by: NURSE ANESTHETIST, CERTIFIED REGISTERED

## 2023-09-11 PROCEDURE — 7100000011 HC PHASE II RECOVERY - ADDTL 15 MIN: Performed by: INTERNAL MEDICINE

## 2023-09-11 PROCEDURE — 2500000003 HC RX 250 WO HCPCS: Performed by: NURSE ANESTHETIST, CERTIFIED REGISTERED

## 2023-09-11 PROCEDURE — 2709999900 HC NON-CHARGEABLE SUPPLY: Performed by: INTERNAL MEDICINE

## 2023-09-11 PROCEDURE — 3600007512: Performed by: INTERNAL MEDICINE

## 2023-09-11 PROCEDURE — 3700000001 HC ADD 15 MINUTES (ANESTHESIA): Performed by: INTERNAL MEDICINE

## 2023-09-11 PROCEDURE — 3600007502: Performed by: INTERNAL MEDICINE

## 2023-09-11 PROCEDURE — 2580000003 HC RX 258: Performed by: NURSE ANESTHETIST, CERTIFIED REGISTERED

## 2023-09-11 RX ORDER — SODIUM CHLORIDE 9 MG/ML
25 INJECTION, SOLUTION INTRAVENOUS PRN
Status: DISCONTINUED | OUTPATIENT
Start: 2023-09-11 | End: 2023-09-11 | Stop reason: HOSPADM

## 2023-09-11 RX ORDER — LIDOCAINE HYDROCHLORIDE 20 MG/ML
INJECTION, SOLUTION EPIDURAL; INFILTRATION; INTRACAUDAL; PERINEURAL PRN
Status: DISCONTINUED | OUTPATIENT
Start: 2023-09-11 | End: 2023-09-11 | Stop reason: SDUPTHER

## 2023-09-11 RX ORDER — M-VIT,TX,IRON,MINS/CALC/FOLIC 27MG-0.4MG
1 TABLET ORAL DAILY
COMMUNITY

## 2023-09-11 RX ORDER — SODIUM CHLORIDE 9 MG/ML
INJECTION, SOLUTION INTRAVENOUS CONTINUOUS
Status: DISCONTINUED | OUTPATIENT
Start: 2023-09-11 | End: 2023-09-11 | Stop reason: HOSPADM

## 2023-09-11 RX ORDER — SODIUM CHLORIDE 0.9 % (FLUSH) 0.9 %
5-40 SYRINGE (ML) INJECTION EVERY 12 HOURS SCHEDULED
Status: DISCONTINUED | OUTPATIENT
Start: 2023-09-11 | End: 2023-09-11 | Stop reason: HOSPADM

## 2023-09-11 RX ORDER — SODIUM CHLORIDE 9 MG/ML
INJECTION, SOLUTION INTRAVENOUS CONTINUOUS PRN
Status: DISCONTINUED | OUTPATIENT
Start: 2023-09-11 | End: 2023-09-11 | Stop reason: SDUPTHER

## 2023-09-11 RX ORDER — SIMETHICONE 20 MG/.3ML
EMULSION ORAL
Status: DISCONTINUED
Start: 2023-09-11 | End: 2023-09-11 | Stop reason: HOSPADM

## 2023-09-11 RX ORDER — SODIUM CHLORIDE 0.9 % (FLUSH) 0.9 %
5-40 SYRINGE (ML) INJECTION PRN
Status: DISCONTINUED | OUTPATIENT
Start: 2023-09-11 | End: 2023-09-11 | Stop reason: HOSPADM

## 2023-09-11 RX ORDER — CALCIUM CARBONATE 500 MG/1
1 TABLET, CHEWABLE ORAL DAILY
COMMUNITY

## 2023-09-11 RX ORDER — CETIRIZINE HYDROCHLORIDE 10 MG/1
10 TABLET ORAL DAILY
COMMUNITY

## 2023-09-11 RX ADMIN — PROPOFOL 20 MG: 10 INJECTION, EMULSION INTRAVENOUS at 09:33

## 2023-09-11 RX ADMIN — PROPOFOL 30 MG: 10 INJECTION, EMULSION INTRAVENOUS at 09:38

## 2023-09-11 RX ADMIN — LIDOCAINE HYDROCHLORIDE 100 MG: 20 INJECTION, SOLUTION EPIDURAL; INFILTRATION; INTRACAUDAL; PERINEURAL at 09:32

## 2023-09-11 RX ADMIN — PROPOFOL 30 MG: 10 INJECTION, EMULSION INTRAVENOUS at 09:34

## 2023-09-11 RX ADMIN — PROPOFOL 50 MG: 10 INJECTION, EMULSION INTRAVENOUS at 09:37

## 2023-09-11 RX ADMIN — PROPOFOL 40 MG: 10 INJECTION, EMULSION INTRAVENOUS at 09:42

## 2023-09-11 RX ADMIN — PROPOFOL 40 MG: 10 INJECTION, EMULSION INTRAVENOUS at 09:46

## 2023-09-11 RX ADMIN — SODIUM CHLORIDE: 9 INJECTION, SOLUTION INTRAVENOUS at 09:17

## 2023-09-11 RX ADMIN — PROPOFOL 30 MG: 10 INJECTION, EMULSION INTRAVENOUS at 09:39

## 2023-09-11 RX ADMIN — PROPOFOL 80 MG: 10 INJECTION, EMULSION INTRAVENOUS at 09:32

## 2023-09-11 NOTE — OP NOTE
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 Following sequential administration of sedation as per above, the gastroscope was inserted into the mouth and advanced under direct vision to second portion of the duodenum. A careful inspection was made as the gastroscope was withdrawn, including a retroflexed view of the proximal stomach; findings and interventions are described below. EGD Findings:  Esophagus:normal mucosa - biopsied, normal GE junction 38 cm from the incisors   Stomach:normal mucosa - biopsied  Duodenum/jejunum:normal mucosa - biopsied    EGD Interventions:  biopsies     The bed was then turned and upon sequential sedation as per above, a digital rectal exam was performed per below. The Olympus videocolonoscope was inserted in the rectum and carefully advanced to the terminal ileum. The quality of preparation was fair. Retroflexion in the rectum was performed. Colon Findings:   Rectum: small external hemorrhoids, normal mucosa - biopsied.  Healthy ileo-rectal anastomosis   Terminal Ileum: normal mucosa - biopsied     Colonoscopy Interventions:  biopsies            Specimens Removed:    ID Type Source Tests Collected by Time Destination   A : Duodenum Biopsy Tissue Duodenum SURGICAL PATHOLOGY Donovan Barksdale MD 9/11/2023 8882    B : Gastric Biopsy Tissue Gastric SURGICAL PATHOLOGY

## 2023-09-11 NOTE — ANESTHESIA POSTPROCEDURE EVALUATION
Department of Anesthesiology  Postprocedure Note    Patient: Piyush Elder  MRN: [id number]  YOB: 1960  Date of evaluation: 9/11/2023      Procedure Summary     Date: 09/11/23 Room / Location: [facility] ENDO  / [facility] ENDOSCOPY    Anesthesia Start: 4793 Anesthesia Stop: 3215    Procedures:       EGD ESOPHAGOGASTRODUODENOSCOPY, biopsy      ANAL PROCTO SIGMOIDOSCOPY FLEXIBLE.  biopsy Diagnosis:       Gastroparesis      S/P laparoscopic colectomy      (Gastroparesis [K31.84])      (S/P laparoscopic colectomy [Z90.49])    Surgeons: Juaquin Bourgeois MD Responsible Provider: Jalil Olvera MD    Anesthesia Type: MAC ASA Status: 2          Anesthesia Type: MAC    Antonina Phase I: Antonina Score: 10    Antonina Phase II:        Anesthesia Post Evaluation    Patient location during evaluation: PACU  Patient participation: complete - patient participated  Level of consciousness: awake  Airway patency: patent  Nausea & Vomiting: no nausea  Complications: no  Cardiovascular status: blood pressure returned to baseline and hemodynamically stable  Respiratory status: acceptable  Hydration status: stable  Multimodal analgesia pain management approach

## 2023-09-11 NOTE — ANESTHESIA PRE PROCEDURE
Department of Anesthesiology  Preprocedure Note       Name:  Courtney Guerrero   Age:  61 y.o.  :  1960                                          MRN:  [id number]         Date:  2023      Surgeon: Saima Peralta):  Gavino Loaiza MD    Procedure: Procedure(s):  EGD ESOPHAGOGASTRODUODENOSCOPY, FLEXIBLE SIGMOIDOSCOPY  ANAL PROCTO SIGMOIDOSCOPY FLEXIBLE    Medications prior to admission:   Prior to Admission medications    Medication Sig Start Date End Date Taking?  Authorizing Provider   aspirin 81 MG EC tablet Take by mouth daily    Ar Automatic Reconciliation   atorvastatin (LIPITOR) 80 MG tablet ceived the following from [facility]: Outside name: atorvastatin (LIPITOR) 80 mg tablet 17   Ar Automatic Reconciliation   baclofen (LIORESAL) 10 MG tablet Take 10 mg by mouth 3 times daily    Ar Automatic Reconciliation   Cholecalciferol 50 MCG (2000) TABS Take 2,000 Units by mouth daily    Ar Automatic Reconciliation   coenzyme Q10 100 MG CAPS capsule Take 100 mg by mouth Daily with supper    Ar Automatic Reconciliation   cyclobenzaprine (FLEXERIL) 10 MG tablet Take 10 mg by mouth    Ar Automatic Reconciliation   diclofenac (VOLTAREN) 75 MG EC tablet Take 1 tablet by mouth 2 times daily 10/13/22   Ar Automatic Reconciliation   dicyclomine (BENTYL) 10 MG capsule Take 10 mg by mouth 4 times daily (before meals and nightly) 12/28/15   Ar Automatic Reconciliation   dilTIAZem (CARDIZEM) 60 MG tablet Take 60 mg by mouth 2 times daily    Ar Automatic Reconciliation   DULoxetine (CYMBALTA) 30 MG extended release capsule Take 60 mg by mouth 2 times daily    Ar Automatic Reconciliation   estradiol (ESTRACE) 0.1 MG/GM vaginal cream Place 2 g vaginally    Ar Automatic Reconciliation   Evening Primrose Oil 500 MG CAPS Take 1,000 mg by mouth daily    Ar Automatic Reconciliation   Evolocumab 140 MG/ML SOAJ Inject 140 mg into the skin    Ar Automatic Reconciliation   ezetimibe (ZETIA) 10 MG tablet Take

## 2023-09-11 NOTE — DISCHARGE INSTRUCTIONS
[address], [address] [place] [address] [place]  [phone]                                  Trace Estrada  [id number]  1960    It was my pleasure seeing you for your procedure. You will also receive a summary report with the findings from this procedure and any further recommendations. If you had polyps removed or biopsies taken during your procedure, you will receive a separate letter from me within approximately the next 2 weeks. If you don't receive this letter or if you have any questions, please call my office [phone]. Please take note of the post procedure instructions listed below. Best Wishes,    Dr. Xochilt Villavicencio    These instructions give you information on caring for yourself after your procedure. Call your doctor if you have any problems or questions after your procedure. HOME CARE  Walk if you have belly cramping or gas. Walking will help get rid of the air and reduce the bloated feeling in your belly (abdomen). Your IV site (where you received drugs) may be tender to touch. Place warm towels on the site; keep your arm up on two pillows if you have any swelling or soreness in the area. You may shower. ACTIVITY:  Take frequent rest periods and move at a slower pace for the next 24 hours. .  You may resume your regular activity tomorrow if you are feeling back to normal.  Do not drive or ride a bicycle for at least 24 hours (because of the medicine (anesthesia) used during the test). Do not sign any important legal documents or use or operate any machinery for 24 hours  Do not take sleeping medicines/nerve drugs for 24 hours unless the doctor tells you. You can return to work/school tomorrow unless otherwise instructed. NUTRITION:  Drink plenty of fluids to keep your pee (urine) clear or pale yellow  Begin with a light meal and progress to your normal diet.  Heavy or fried foods are harder to digest and

## 2023-11-02 ENCOUNTER — TRANSCRIBE ORDERS (OUTPATIENT)
Facility: HOSPITAL | Age: 63
End: 2023-11-02

## 2023-11-02 DIAGNOSIS — M51.36 DEGENERATIVE DISC DISEASE, LUMBAR: Primary | ICD-10-CM

## 2023-11-03 ENCOUNTER — HOSPITAL ENCOUNTER (OUTPATIENT)
Facility: HOSPITAL | Age: 63
End: 2023-11-03
Attending: PHYSICAL MEDICINE & REHABILITATION
Payer: MEDICARE

## 2023-11-03 DIAGNOSIS — M51.36 DEGENERATIVE DISC DISEASE, LUMBAR: ICD-10-CM

## 2023-11-03 PROCEDURE — 72148 MRI LUMBAR SPINE W/O DYE: CPT

## 2024-12-30 ENCOUNTER — HOSPITAL ENCOUNTER (OUTPATIENT)
Facility: HOSPITAL | Age: 64
Discharge: HOME | End: 2025-01-02
Attending: ORTHOPAEDIC SURGERY
Payer: MEDICARE

## 2024-12-30 DIAGNOSIS — M25.551 ACUTE RIGHT HIP PAIN: ICD-10-CM

## 2024-12-30 PROCEDURE — 73721 MRI JNT OF LWR EXTRE W/O DYE: CPT

## 2025-04-23 ENCOUNTER — TRANSCRIBE ORDERS (OUTPATIENT)
Facility: HOSPITAL | Age: 65
End: 2025-04-23

## 2025-04-23 DIAGNOSIS — M54.42 CHRONIC MIDLINE LOW BACK PAIN WITH BILATERAL SCIATICA: ICD-10-CM

## 2025-04-23 DIAGNOSIS — G89.29 CHRONIC MIDLINE LOW BACK PAIN WITH BILATERAL SCIATICA: ICD-10-CM

## 2025-04-23 DIAGNOSIS — M54.41 CHRONIC MIDLINE LOW BACK PAIN WITH BILATERAL SCIATICA: ICD-10-CM

## 2025-04-23 DIAGNOSIS — M47.816 LUMBAR SPONDYLOSIS: Primary | ICD-10-CM

## 2025-04-23 DIAGNOSIS — M51.360 DEGENERATION OF INTERVERTEBRAL DISC OF LUMBAR REGION WITH DISCOGENIC BACK PAIN: ICD-10-CM

## 2025-05-14 ENCOUNTER — HOSPITAL ENCOUNTER (OUTPATIENT)
Facility: HOSPITAL | Age: 65
Discharge: HOME | End: 2025-05-17
Attending: PHYSICAL MEDICINE & REHABILITATION
Payer: MEDICARE

## 2025-05-14 DIAGNOSIS — G89.29 CHRONIC MIDLINE LOW BACK PAIN WITH BILATERAL SCIATICA: ICD-10-CM

## 2025-05-14 DIAGNOSIS — M54.42 CHRONIC MIDLINE LOW BACK PAIN WITH BILATERAL SCIATICA: ICD-10-CM

## 2025-05-14 DIAGNOSIS — M51.360 DEGENERATION OF INTERVERTEBRAL DISC OF LUMBAR REGION WITH DISCOGENIC BACK PAIN: ICD-10-CM

## 2025-05-14 DIAGNOSIS — M54.41 CHRONIC MIDLINE LOW BACK PAIN WITH BILATERAL SCIATICA: ICD-10-CM

## 2025-05-14 DIAGNOSIS — M47.816 LUMBAR SPONDYLOSIS: ICD-10-CM

## 2025-05-14 PROCEDURE — 72148 MRI LUMBAR SPINE W/O DYE: CPT

## (undated) DEVICE — Device

## (undated) DEVICE — BASIN EMSIS 16OZ GRAPHITE PLAS KID SHP MOLD GRAD FOR ORAL

## (undated) DEVICE — SYRINGE 50ML E/T

## (undated) DEVICE — FORCEPS BX L240CM JAW DIA2.8MM L CAP W/ NDL MIC MESH TOOTH